# Patient Record
Sex: MALE | Race: BLACK OR AFRICAN AMERICAN | NOT HISPANIC OR LATINO | URBAN - METROPOLITAN AREA
[De-identification: names, ages, dates, MRNs, and addresses within clinical notes are randomized per-mention and may not be internally consistent; named-entity substitution may affect disease eponyms.]

---

## 2018-03-07 RX ADMIN — Medication 975 MILLIGRAM(S): at 23:37

## 2018-03-08 ENCOUNTER — INPATIENT (INPATIENT)
Facility: HOSPITAL | Age: 38
LOS: 5 days | Discharge: HOME | End: 2018-03-14
Attending: INTERNAL MEDICINE | Admitting: INTERNAL MEDICINE
Payer: MEDICAID

## 2018-03-08 VITALS
HEART RATE: 116 BPM | TEMPERATURE: 102 F | WEIGHT: 160.06 LBS | RESPIRATION RATE: 18 BRPM | SYSTOLIC BLOOD PRESSURE: 114 MMHG | OXYGEN SATURATION: 99 % | DIASTOLIC BLOOD PRESSURE: 64 MMHG

## 2018-03-08 LAB
BASOPHILS # BLD AUTO: 0.03 K/UL — SIGNIFICANT CHANGE UP (ref 0–0.2)
BASOPHILS NFR BLD AUTO: 0.3 % — SIGNIFICANT CHANGE UP (ref 0–2)
EOSINOPHIL # BLD AUTO: 0.15 K/UL — SIGNIFICANT CHANGE UP (ref 0–0.5)
EOSINOPHIL NFR BLD AUTO: 1.7 % — SIGNIFICANT CHANGE UP (ref 0–6)
HCT VFR BLD CALC: 48.3 % — SIGNIFICANT CHANGE UP (ref 39–50)
HGB BLD-MCNC: 15.3 G/DL — SIGNIFICANT CHANGE UP (ref 13–17)
IMM GRANULOCYTES # BLD AUTO: 0.03 # — SIGNIFICANT CHANGE UP
IMM GRANULOCYTES NFR BLD AUTO: 0.3 % — SIGNIFICANT CHANGE UP (ref 0–1.5)
LYMPHOCYTES # BLD AUTO: 0.64 K/UL — LOW (ref 1–3.3)
LYMPHOCYTES # BLD AUTO: 7.2 % — LOW (ref 13–44)
MCHC RBC-ENTMCNC: 29.4 PG — SIGNIFICANT CHANGE UP (ref 27–34)
MCHC RBC-ENTMCNC: 31.7 % — LOW (ref 32–36)
MCV RBC AUTO: 92.7 FL — SIGNIFICANT CHANGE UP (ref 80–100)
MONOCYTES # BLD AUTO: 1.07 K/UL — HIGH (ref 0–0.9)
MONOCYTES NFR BLD AUTO: 12.1 % — SIGNIFICANT CHANGE UP (ref 2–14)
NEUTROPHILS # BLD AUTO: 6.92 K/UL — SIGNIFICANT CHANGE UP (ref 1.8–7.4)
NEUTROPHILS NFR BLD AUTO: 78.4 % — HIGH (ref 43–77)
NRBC # FLD: 0 — SIGNIFICANT CHANGE UP
PLATELET # BLD AUTO: 202 K/UL — SIGNIFICANT CHANGE UP (ref 150–400)
PMV BLD: 9.8 FL — SIGNIFICANT CHANGE UP (ref 7–13)
RBC # BLD: 5.21 M/UL — SIGNIFICANT CHANGE UP (ref 4.2–5.8)
RBC # FLD: 12.9 % — SIGNIFICANT CHANGE UP (ref 10.3–14.5)
WBC # BLD: 8.84 K/UL — SIGNIFICANT CHANGE UP (ref 3.8–10.5)
WBC # FLD AUTO: 8.84 K/UL — SIGNIFICANT CHANGE UP (ref 3.8–10.5)

## 2018-03-08 PROCEDURE — 71046 X-RAY EXAM CHEST 2 VIEWS: CPT | Mod: 26

## 2018-03-08 RX ORDER — SODIUM CHLORIDE 9 MG/ML
2000 INJECTION INTRAMUSCULAR; INTRAVENOUS; SUBCUTANEOUS ONCE
Qty: 0 | Refills: 0 | Status: COMPLETED | OUTPATIENT
Start: 2018-03-08 | End: 2018-03-08

## 2018-03-08 RX ORDER — METOCLOPRAMIDE HCL 10 MG
10 TABLET ORAL ONCE
Qty: 0 | Refills: 0 | Status: COMPLETED | OUTPATIENT
Start: 2018-03-08 | End: 2018-03-08

## 2018-03-08 RX ORDER — ACETAMINOPHEN 500 MG
975 TABLET ORAL ONCE
Qty: 0 | Refills: 0 | Status: COMPLETED | OUTPATIENT
Start: 2018-03-08 | End: 2018-03-08

## 2018-03-08 RX ORDER — KETOROLAC TROMETHAMINE 30 MG/ML
15 SYRINGE (ML) INJECTION ONCE
Qty: 0 | Refills: 0 | Status: DISCONTINUED | OUTPATIENT
Start: 2018-03-08 | End: 2018-03-08

## 2018-03-08 RX ADMIN — Medication 15 MILLIGRAM(S): at 23:07

## 2018-03-08 RX ADMIN — Medication 10 MILLIGRAM(S): at 23:07

## 2018-03-08 RX ADMIN — Medication 15 MILLIGRAM(S): at 23:37

## 2018-03-08 RX ADMIN — SODIUM CHLORIDE 1000 MILLILITER(S): 9 INJECTION INTRAMUSCULAR; INTRAVENOUS; SUBCUTANEOUS at 23:07

## 2018-03-08 RX ADMIN — Medication 975 MILLIGRAM(S): at 23:07

## 2018-03-08 NOTE — ED PROVIDER NOTE - MEDICAL DECISION MAKING DETAILS
38 yo male with flu like illness, syncope, tachycardia and short HI on ekg- multiple previous syncopes  in the past. will obtain cbc, cmp cardiac enzymes to r/o myocarditis, cxr, and admit.

## 2018-03-08 NOTE — ED PROVIDER NOTE - ATTENDING CONTRIBUTION TO CARE
Cas: 36 yo male with no significant medical history c/o URI- flu like illness since yesterday. +nonproductive cough, nasal congestion and fevers. Pt also c/o mylagias. No chest pain or SOB. No abdominal pain, diaphoresis, nausea or vomiting. Pt had an episode of syncope today while sitting in a chair. endorses multiple syncopal episodes in the past- associated with illness or stress. Exam: ill appearing, NAD, + nasal congestion, MMM, +regular tachycardia with subtle murmur at left sternal border. lungs CTA b/l. No LE edema or calf TTP. abdomen is soft and nontender. A/P- 36 yo male with flu like illness, syncope, tachycardia and short ME on ekg- multiple previous syncopes  in the past. will obtain cbc, cmp cardiac enzymes to r/o myocarditis, cxr, and admit to tele.

## 2018-03-08 NOTE — ED PROVIDER NOTE - OBJECTIVE STATEMENT
36 y/o M with no pertinent medical history presents with complaint of headache, fever and body aches since last night. Symptoms worsened today. Also having cough. Denies any chest pain or SOB. Patient reports that he passed out earlier today while sitting in [employer], did not fall. Reports that he has had previous episodes of syncope in the past in relation to pain. Patient's wife had similar symptoms a few days ago and works in [employer] as PCA.

## 2018-03-08 NOTE — ED ADULT TRIAGE NOTE - CHIEF COMPLAINT QUOTE
Ambulatory complaining of HA, lower back pain and BL shoulder pain since this morning. Febrile in triage, denies travel or sick contacks. States that he was at the [employer] shop this morning and passed out while in the chair for two minutes or so. States he has a Hx of passing out. Denies hitting his head, pain on urination. Unable to sit in triage. Pain 10/10

## 2018-03-08 NOTE — ED PROVIDER NOTE - SHIFT CHANGE DETAILS
I have signed over this patient to the above attending physician. Pertinent history, physical exam findings and workup thus far in the ED have been discussed. The pending tests and plan, including labs, cxr, rvp, and admission were signed over.  All questions from the above attending physician have been answered.

## 2018-03-09 DIAGNOSIS — R94.31 ABNORMAL ELECTROCARDIOGRAM [ECG] [EKG]: ICD-10-CM

## 2018-03-09 DIAGNOSIS — J11.1 INFLUENZA DUE TO UNIDENTIFIED INFLUENZA VIRUS WITH OTHER RESPIRATORY MANIFESTATIONS: ICD-10-CM

## 2018-03-09 DIAGNOSIS — R55 SYNCOPE AND COLLAPSE: ICD-10-CM

## 2018-03-09 DIAGNOSIS — Z29.9 ENCOUNTER FOR PROPHYLACTIC MEASURES, UNSPECIFIED: ICD-10-CM

## 2018-03-09 LAB
ALBUMIN SERPL ELPH-MCNC: 4.4 G/DL — SIGNIFICANT CHANGE UP (ref 3.3–5)
ALP SERPL-CCNC: 54 U/L — SIGNIFICANT CHANGE UP (ref 40–120)
ALT FLD-CCNC: 28 U/L — SIGNIFICANT CHANGE UP (ref 4–41)
AMPHET UR-MCNC: NEGATIVE — SIGNIFICANT CHANGE UP
APAP SERPL-MCNC: < 15 UG/ML — LOW (ref 15–25)
AST SERPL-CCNC: 27 U/L — SIGNIFICANT CHANGE UP (ref 4–40)
B PERT DNA SPEC QL NAA+PROBE: SIGNIFICANT CHANGE UP
BARBITURATES MEASUREMENT: NEGATIVE — SIGNIFICANT CHANGE UP
BARBITURATES UR SCN-MCNC: NEGATIVE — SIGNIFICANT CHANGE UP
BENZODIAZ SERPL-MCNC: NEGATIVE — SIGNIFICANT CHANGE UP
BENZODIAZ UR-MCNC: NEGATIVE — SIGNIFICANT CHANGE UP
BILIRUB SERPL-MCNC: 0.7 MG/DL — SIGNIFICANT CHANGE UP (ref 0.2–1.2)
BUN SERPL-MCNC: 11 MG/DL — SIGNIFICANT CHANGE UP (ref 7–23)
BUN SERPL-MCNC: 12 MG/DL — SIGNIFICANT CHANGE UP (ref 7–23)
C PNEUM DNA SPEC QL NAA+PROBE: NOT DETECTED — SIGNIFICANT CHANGE UP
CALCIUM SERPL-MCNC: 8.3 MG/DL — LOW (ref 8.4–10.5)
CALCIUM SERPL-MCNC: 9.1 MG/DL — SIGNIFICANT CHANGE UP (ref 8.4–10.5)
CANNABINOIDS UR-MCNC: NEGATIVE — SIGNIFICANT CHANGE UP
CHLORIDE SERPL-SCNC: 101 MMOL/L — SIGNIFICANT CHANGE UP (ref 98–107)
CHLORIDE SERPL-SCNC: 107 MMOL/L — SIGNIFICANT CHANGE UP (ref 98–107)
CHOLEST SERPL-MCNC: 101 MG/DL — LOW (ref 120–199)
CK MB BLD-MCNC: 1 NG/ML — SIGNIFICANT CHANGE UP (ref 1–6.6)
CK MB BLD-MCNC: 1 NG/ML — SIGNIFICANT CHANGE UP (ref 1–6.6)
CK SERPL-CCNC: 107 U/L — SIGNIFICANT CHANGE UP (ref 30–200)
CK SERPL-CCNC: 130 U/L — SIGNIFICANT CHANGE UP (ref 30–200)
CO2 SERPL-SCNC: 24 MMOL/L — SIGNIFICANT CHANGE UP (ref 22–31)
CO2 SERPL-SCNC: 26 MMOL/L — SIGNIFICANT CHANGE UP (ref 22–31)
COCAINE METAB.OTHER UR-MCNC: NEGATIVE — SIGNIFICANT CHANGE UP
CREAT SERPL-MCNC: 1.01 MG/DL — SIGNIFICANT CHANGE UP (ref 0.5–1.3)
CREAT SERPL-MCNC: 1.17 MG/DL — SIGNIFICANT CHANGE UP (ref 0.5–1.3)
ETHANOL BLD-MCNC: < 10 MG/DL — SIGNIFICANT CHANGE UP
FLUAV H1 2009 PAND RNA SPEC QL NAA+PROBE: NOT DETECTED — SIGNIFICANT CHANGE UP
FLUAV H1 RNA SPEC QL NAA+PROBE: NOT DETECTED — SIGNIFICANT CHANGE UP
FLUAV H3 RNA SPEC QL NAA+PROBE: POSITIVE — HIGH
FLUBV RNA SPEC QL NAA+PROBE: NOT DETECTED — SIGNIFICANT CHANGE UP
GLUCOSE SERPL-MCNC: 83 MG/DL — SIGNIFICANT CHANGE UP (ref 70–99)
GLUCOSE SERPL-MCNC: 95 MG/DL — SIGNIFICANT CHANGE UP (ref 70–99)
HADV DNA SPEC QL NAA+PROBE: NOT DETECTED — SIGNIFICANT CHANGE UP
HBA1C BLD-MCNC: 5.6 % — SIGNIFICANT CHANGE UP (ref 4–5.6)
HCOV 229E RNA SPEC QL NAA+PROBE: NOT DETECTED — SIGNIFICANT CHANGE UP
HCOV HKU1 RNA SPEC QL NAA+PROBE: NOT DETECTED — SIGNIFICANT CHANGE UP
HCOV NL63 RNA SPEC QL NAA+PROBE: NOT DETECTED — SIGNIFICANT CHANGE UP
HCOV OC43 RNA SPEC QL NAA+PROBE: NOT DETECTED — SIGNIFICANT CHANGE UP
HCT VFR BLD CALC: 44.3 % — SIGNIFICANT CHANGE UP (ref 39–50)
HDLC SERPL-MCNC: 53 MG/DL — SIGNIFICANT CHANGE UP (ref 35–55)
HGB BLD-MCNC: 13.9 G/DL — SIGNIFICANT CHANGE UP (ref 13–17)
HMPV RNA SPEC QL NAA+PROBE: NOT DETECTED — SIGNIFICANT CHANGE UP
HPIV1 RNA SPEC QL NAA+PROBE: NOT DETECTED — SIGNIFICANT CHANGE UP
HPIV2 RNA SPEC QL NAA+PROBE: NOT DETECTED — SIGNIFICANT CHANGE UP
HPIV3 RNA SPEC QL NAA+PROBE: NOT DETECTED — SIGNIFICANT CHANGE UP
HPIV4 RNA SPEC QL NAA+PROBE: NOT DETECTED — SIGNIFICANT CHANGE UP
LIPID PNL WITH DIRECT LDL SERPL: 58 MG/DL — SIGNIFICANT CHANGE UP
M PNEUMO DNA SPEC QL NAA+PROBE: NOT DETECTED — SIGNIFICANT CHANGE UP
MAGNESIUM SERPL-MCNC: 1.9 MG/DL — SIGNIFICANT CHANGE UP (ref 1.6–2.6)
MAGNESIUM SERPL-MCNC: 2.1 MG/DL — SIGNIFICANT CHANGE UP (ref 1.6–2.6)
MCHC RBC-ENTMCNC: 29.6 PG — SIGNIFICANT CHANGE UP (ref 27–34)
MCHC RBC-ENTMCNC: 31.4 % — LOW (ref 32–36)
MCV RBC AUTO: 94.5 FL — SIGNIFICANT CHANGE UP (ref 80–100)
METHADONE UR-MCNC: NEGATIVE — SIGNIFICANT CHANGE UP
NRBC # FLD: 0 — SIGNIFICANT CHANGE UP
OPIATES UR-MCNC: NEGATIVE — SIGNIFICANT CHANGE UP
OXYCODONE UR-MCNC: NEGATIVE — SIGNIFICANT CHANGE UP
PCP UR-MCNC: NEGATIVE — SIGNIFICANT CHANGE UP
PHOSPHATE SERPL-MCNC: 2.4 MG/DL — LOW (ref 2.5–4.5)
PLATELET # BLD AUTO: 168 K/UL — SIGNIFICANT CHANGE UP (ref 150–400)
PMV BLD: 10.2 FL — SIGNIFICANT CHANGE UP (ref 7–13)
POTASSIUM SERPL-MCNC: 4.4 MMOL/L — SIGNIFICANT CHANGE UP (ref 3.5–5.3)
POTASSIUM SERPL-MCNC: 4.5 MMOL/L — SIGNIFICANT CHANGE UP (ref 3.5–5.3)
POTASSIUM SERPL-SCNC: 4.4 MMOL/L — SIGNIFICANT CHANGE UP (ref 3.5–5.3)
POTASSIUM SERPL-SCNC: 4.5 MMOL/L — SIGNIFICANT CHANGE UP (ref 3.5–5.3)
PROT SERPL-MCNC: 7.5 G/DL — SIGNIFICANT CHANGE UP (ref 6–8.3)
RBC # BLD: 4.69 M/UL — SIGNIFICANT CHANGE UP (ref 4.2–5.8)
RBC # FLD: 13 % — SIGNIFICANT CHANGE UP (ref 10.3–14.5)
RSV RNA SPEC QL NAA+PROBE: NOT DETECTED — SIGNIFICANT CHANGE UP
RV+EV RNA SPEC QL NAA+PROBE: NOT DETECTED — SIGNIFICANT CHANGE UP
SALICYLATES SERPL-MCNC: < 5 MG/DL — LOW (ref 15–30)
SODIUM SERPL-SCNC: 139 MMOL/L — SIGNIFICANT CHANGE UP (ref 135–145)
SODIUM SERPL-SCNC: 141 MMOL/L — SIGNIFICANT CHANGE UP (ref 135–145)
TRIGL SERPL-MCNC: 30 MG/DL — SIGNIFICANT CHANGE UP (ref 10–149)
TROPONIN T SERPL-MCNC: < 0.06 NG/ML — SIGNIFICANT CHANGE UP (ref 0–0.06)
TROPONIN T SERPL-MCNC: < 0.06 NG/ML — SIGNIFICANT CHANGE UP (ref 0–0.06)
TSH SERPL-MCNC: 0.28 UIU/ML — SIGNIFICANT CHANGE UP (ref 0.27–4.2)
WBC # BLD: 7.85 K/UL — SIGNIFICANT CHANGE UP (ref 3.8–10.5)
WBC # FLD AUTO: 7.85 K/UL — SIGNIFICANT CHANGE UP (ref 3.8–10.5)

## 2018-03-09 PROCEDURE — 93306 TTE W/DOPPLER COMPLETE: CPT | Mod: 26

## 2018-03-09 PROCEDURE — 99222 1ST HOSP IP/OBS MODERATE 55: CPT | Mod: GC

## 2018-03-09 RX ORDER — SODIUM CHLORIDE 9 MG/ML
1000 INJECTION INTRAMUSCULAR; INTRAVENOUS; SUBCUTANEOUS
Qty: 0 | Refills: 0 | Status: DISCONTINUED | OUTPATIENT
Start: 2018-03-09 | End: 2018-03-11

## 2018-03-09 RX ORDER — INFLUENZA VIRUS VACCINE 15; 15; 15; 15 UG/.5ML; UG/.5ML; UG/.5ML; UG/.5ML
0.5 SUSPENSION INTRAMUSCULAR ONCE
Qty: 0 | Refills: 0 | Status: DISCONTINUED | OUTPATIENT
Start: 2018-03-09 | End: 2018-03-14

## 2018-03-09 RX ORDER — ACETAMINOPHEN 500 MG
650 TABLET ORAL EVERY 6 HOURS
Qty: 0 | Refills: 0 | Status: DISCONTINUED | OUTPATIENT
Start: 2018-03-09 | End: 2018-03-14

## 2018-03-09 RX ADMIN — Medication 650 MILLIGRAM(S): at 16:30

## 2018-03-09 RX ADMIN — Medication 650 MILLIGRAM(S): at 08:57

## 2018-03-09 RX ADMIN — SODIUM CHLORIDE 150 MILLILITER(S): 9 INJECTION INTRAMUSCULAR; INTRAVENOUS; SUBCUTANEOUS at 02:43

## 2018-03-09 RX ADMIN — Medication 75 MILLIGRAM(S): at 06:02

## 2018-03-09 RX ADMIN — Medication 75 MILLIGRAM(S): at 17:21

## 2018-03-09 NOTE — H&P ADULT - PROBLEM SELECTOR PLAN 2
RVP positive for Influenza   Will start patient on Tamiflu 75mg BID for 5 days   Placed on droplet precautions RVP positive for Influenza    Tamiflu 75mg BID for 5 days   Placed on droplet precautions

## 2018-03-09 NOTE — H&P ADULT - NEGATIVE MUSCULOSKELETAL SYMPTOMS
no muscle weakness/no arthralgia/no myalgia/no joint swelling/no muscle cramps/no arthritis/no stiffness/no neck pain

## 2018-03-09 NOTE — H&P ADULT - NEGATIVE GASTROINTESTINAL SYMPTOMS
no diarrhea/no melena/no change in bowel habits/no hematochezia/no nausea/no vomiting/no constipation/no abdominal pain

## 2018-03-09 NOTE — H&P ADULT - ASSESSMENT
38 y/o M with no PMH presented with flu like symptoms and syncope    +Syncope  +Flu positive-On Tamiflu

## 2018-03-09 NOTE — H&P ADULT - NEGATIVE CARDIOVASCULAR SYMPTOMS
no orthopnea/no paroxysmal nocturnal dyspnea/no chest pain/no dyspnea on exertion/no peripheral edema/no palpitations

## 2018-03-09 NOTE — H&P ADULT - RS GEN PE MLT RESP DETAILS PC
breath sounds equal/good air movement/no chest wall tenderness/no intercostal retractions/normal/no rhonchi/no rales/no wheezes/respirations non-labored/airway patent/clear to auscultation bilaterally

## 2018-03-09 NOTE — H&P ADULT - FAMILY HISTORY
Grandparent  Still living? Unknown  Family history of cerebrovascular accident (CVA), Age at diagnosis: Age Unknown  Family history of prostate cancer, Age at diagnosis: Age Unknown

## 2018-03-09 NOTE — H&P ADULT - PROBLEM SELECTOR PLAN 1
Will monitor on telemetry, serial CE's, serial EKG  HgbA1C, TSH, lipid profile, CBC, CMP in am   TTE ordered to evaluate LVEF   Orthostatics ordered  Fall precautions ordered   Started on IVF at 150cc/hr due to low blood pressure Tele  TTE   Fall precautions  Cardio eval noted.

## 2018-03-09 NOTE — H&P ADULT - NEGATIVE NEUROLOGICAL SYMPTOMS
no weakness/no generalized seizures/no focal seizures/no hemiparesis/no tremors/no vertigo/no loss of sensation/no paresthesias/no difficulty walking/no confusion

## 2018-03-09 NOTE — H&P ADULT - NSHPLABSRESULTS_GEN_ALL_CORE
15.3   8.84  )-----------( 202      ( 08 Mar 2018 23:05 )             48.3     03-08    139  |  101  |  12  ----------------------------<  95  4.5   |  24  |  1.17    Ca    9.1      08 Mar 2018 23:05  Phos  2.4     03-08  Mg     1.9     03-08    TPro  7.5  /  Alb  4.4  /  TBili  0.7  /  DBili  x   /  AST  27  /  ALT  28  /  AlkPhos  54  03-08    CARDIAC MARKERS ( 08 Mar 2018 23:05 )  x     / < 0.06 ng/mL / 130 u/L / 1.00 ng/mL / x        EKG: Sinus rhythm with sinus arrhythmia with short IN interval at a rate of 97 with QTc of 383

## 2018-03-09 NOTE — CONSULT NOTE ADULT - ASSESSMENT
37 year old man admitted with syncope and influenza    1. Acute influenza  tamiflu as ordered  med eval     2. Syncope   likely secondary to volume status in setting of febrile/flu illness  ecg with tachycardia expected from viral infection but with short pr interval and some suggestion of preexcitation on some leads  in light of previous syncopal episodes will call eps to evaluate    check echo   check orthostatics  tele    dvt ppx

## 2018-03-09 NOTE — H&P ADULT - NEGATIVE OPHTHALMOLOGIC SYMPTOMS
no blurred vision R/no lacrimation L/no lacrimation R/no discharge L/no diplopia/no photophobia/no blurred vision L/no discharge R

## 2018-03-09 NOTE — H&P ADULT - PROBLEM SELECTOR PLAN 3
EKG revealed short MN interval(mild delta waves in V3-V6)  Will monitor on telemetry for now   Consider EP consult in am if warranted   TTE ordered EKG revealed short CO interval(mild delta waves in V4-V6)  Will monitor on telemetry for now   Consider EP consult in am if warranted   TTE ordered Tele  TTE

## 2018-03-09 NOTE — H&P ADULT - HISTORY OF PRESENT ILLNESS
36 y/o M with no PMH presented with flu like symptoms and syncope. As per the patient he had flu like symptoms last week which he got over and yesterday went to sleep with a headache and woke up with severe frontal headache. Patient then also endorsed body aches, subjective fever, non productive cough and nasal discharge that started yesterday. [place]. Patient stated that yesterday he was sitting on the chair about to get his hair cut when he developed lightheadedness and dizziness and passed out. Patient stated that he passed out for about few seconds and then when he came about he knew where he was. Patient stated that he had an episode of syncope in the past. Patient denied any head trauma with this syncopal event, any bowel or bladder incontinence. Patient denied any chills, CP, palpitations, N/V/D/C, abdominal pain, dysuria, melena, hematochezia, sick contact, pleuritic or positional chest pain.    On ED admission EKG revealed Sinus rhythm with sinus arrhythmia with short SD interval at a rate of 97 with QTc of 383, CE x 1: Negative, RVP: Positive flu, Phos: 2.4. Patient received 2L of fluid in the ED.

## 2018-03-09 NOTE — H&P ADULT - GASTROINTESTINAL DETAILS
nontender/no rebound tenderness/no rigidity/no guarding/normal/soft/no distention/bowel sounds normal

## 2018-03-09 NOTE — CONSULT NOTE ADULT - SUBJECTIVE AND OBJECTIVE BOX
CARDIOLOGY CONSULT NOTE - DR. BUSH    CHIEF COMPLAINT/REASON FOR CONSULT:    HPI:  Patient is a 37 year old man with no medical history admitted with flu like symptoms and syncope.   C/O headache, fever, body aches  + syncope yesterday while in the chair  also describes syncopal episodes last year while standing and working on an automobile       Patient denies any chest pain, dyspnea, palpitations, edema, exertional symptoms, nausea, abdominal pain,,  or rash.  Denies any history of CAD, MI, valve disease, cardiomyopathy, or congenital heart disease.      PAST MEDICAL & SURGICAL HISTORY:  No pertinent past medical history  No significant past surgical history        PREVIOUS DIAGNOSTIC TESTING:    [ ] Echocardiogram:  [ ]  Catheterization:  [ ] Stress Test:  	    MEDICATIONS:  MEDICATIONS  (STANDING):  oseltamivir 75 milliGRAM(s) Oral two times a day  sodium chloride 0.9%. 1000 milliLiter(s) (150 mL/Hr) IV Continuous <Continuous>      FAMILY HISTORY:  Family history of prostate cancer (Grandparent)  Family history of cerebrovascular accident (CVA) (Grandparent)      SOCIAL HISTORY:    [x ] Non-smoker  [ ] Smoker  [ ] Alcohol    Allergies    No Known Allergies    Intolerances    	    REVIEW OF SYSTEMS:  CONSTITUTIONAL: No weight loss,   EYES: No eye pain, visual disturbances, or discharge  ENMT:  No difficulty hearing, tinnitus, vertigo; No sinus or throat pain  NECK: No pain or stiffness  RESPIRATORY: No wheezing, or hemoptysis;  CARDIOVASCULAR: No chest pain,or leg swelling  GASTROINTESTINAL: No abdominal or epigastric pain. No nausea, vomiting, or hematemesis; No diarrhea or constipation. No melena or hematochezia.  GENITOURINARY: No dysuria, frequency, hematuria, or incontinence  NEUROLOGICAL: No headaches, memory loss, loss of strength, numbness, or tremors  SKIN: No itching, burning, rashes, or lesions   	    [ ] All others negative	  [ ] Unable to obtain    PHYSICAL EXAM:  T(C): 36.8 (03-09-18 @ 06:38), Max: 38.7 (03-08-18 @ 20:14)  HR: 102 (03-09-18 @ 06:38) (94 - 116)  BP: 118/69 (03-09-18 @ 06:38) (106/45 - 118/69)  RR: 18 (03-09-18 @ 06:38) (18 - 18)  SpO2: 100% (03-09-18 @ 06:38) (97% - 100%)  Wt(kg): --  I&O's Summary      Appearance: Normal	  Psychiatry: A & O x 3, Mood & affect appropriate  HEENT:   Normal oral mucosa, PERRL, EOMI	  Lymphatic: No lymphadenopathy  Cardiovascular: Normal S1 S2,RRR, No JVD, No murmurs  Respiratory: Lungs clear to auscultation	  Gastrointestinal:  Soft, Non-tender, + BS	  Skin: No rashes, No ecchymoses, No cyanosis	  Neurologic: Non-focal  Extremities: Normal range of motion, No clubbing, cyanosis or edema  Vascular: Peripheral pulses palpable 2+ bilaterally    TELEMETRY: 	    ECG:  	sr, short pr   RADIOLOGY:  OTHER: 	  	  LABS:	 	    CARDIAC MARKERS:      CKMB: 1.00 ng/mL (03-09 @ 05:45)  CKMB: 1.00 ng/mL (03-08 @ 23:05)                              13.9   7.85  )-----------( 168      ( 09 Mar 2018 05:45 )             44.3     03-09    141  |  107  |  11  ----------------------------<  83  4.4   |  26  |  1.01    Ca    8.3<L>      09 Mar 2018 05:45  Phos  2.4     03-08  Mg     2.1     03-09    TPro  7.5  /  Alb  4.4  /  TBili  0.7  /  DBili  x   /  AST  27  /  ALT  28  /  AlkPhos  54  03-08      proBNP:   Lipid Profile:   HgA1c: Hemoglobin A1C, Whole Blood: 5.6 % (03-09 @ 05:45)    TSH: Thyroid Stimulating Hormone, Serum: 0.28 uIU/mL (03-08 @ 23:05)      ASSESSMENT/PLAN:
Patient seen and evaluated at bedside    Chief Complaint: syncope    HPI:  36 y/o M with no PMH presented with flu like symptoms and syncope. As per the patient he had flu like symptoms last week which he got over and yesterday went to sleep with a headache and woke up with severe frontal headache. Patient then also endorsed body aches, subjective fever, non productive cough and nasal discharge that started yesterday. Patient stated [place] so [place] people. Patient stated that yesterday he was sitting on the chair about to get his hair cut when he developed lightheadedness and dizziness and passed out. Patient stated that he passed out for about few seconds and then when he came about he knew where he was. Patient stated that he had an episode of syncope in the past. Patient denied any head trauma with this syncopal event, any bowel or bladder incontinence. Patient denied any chills, CP, palpitations, N/V/D/C, abdominal pain, dysuria, melena, hematochezia, sick contact, pleuritic or positional chest pain.      PMH:   No pertinent past medical history      PSH:   No significant past surgical history      Medications:   acetaminophen   Tablet 650 milliGRAM(s) Oral every 6 hours PRN  oseltamivir 75 milliGRAM(s) Oral two times a day  sodium chloride 0.9%. 1000 milliLiter(s) IV Continuous <Continuous>      Allergies:  No Known Allergies      FAMILY HISTORY:  Family history of prostate cancer (Grandparent)  Family history of cerebrovascular accident (CVA) (Grandparent)      Social History:  Smoking History: denies  Alcohol Use: denies  Drug Use: denies    Review of Systems:  REVIEW OF SYSTEMS:    CONSTITUTIONAL: +fevers and chills  EYES/ENT: No visual changes;  No dysphagia  RESPIRATORY: No cough, wheezing, hemoptysis; No shortness of breath  CARDIOVASCULAR: No chest pain or palpitations; No lower extremity edema  GASTROINTESTINAL: No abdominal or epigastric pain. No nausea, vomiting, or hematemesis; No diarrhea or constipation. No melena or hematochezia.  BACK: +back pain  GENITOURINARY: No dysuria, frequency or hematuria  NEUROLOGICAL: No numbness or weakness  SKIN: No itching, burning, rashes, or lesions     Physical Exam:  T(F): 99.1 (-), Max: 101.7 (-)  HR: 101 (03-09) (94 - 116)  BP: 93/55 () (93/55 - 118/69)  RR: 20 ()  SpO2: 100% ()  GENERAL: No acute distress, well-developed  HEAD:  Atraumatic, Normocephalic  ENT: EOMI, No JVD, moist mucosa  CHEST/LUNG: Clear to auscultation bilaterally; No wheeze, equal breath sounds bilaterally   HEART: Regular rate and rhythm; No murmurs, rubs, or gallops  ABDOMEN: Soft, Nontender, Nondistended; Bowel sounds present  EXTREMITIES:  No clubbing, cyanosis, or edema  PSYCH: Nl behavior, nl affect  NEUROLOGY: AAOx3, non-focal    Cardiovascular Diagnostic Testing:    ECG: Personally reviewed  SR, HR 97, short MD    Labs: Personally reviewed                        13.9   7.85  )-----------( 168      ( 09 Mar 2018 05:45 )             44.3         141  |  107  |  11  ----------------------------<  83  4.4   |  26  |  1.01    Ca    8.3<L>      09 Mar 2018 05:45  Phos  2.4     -  Mg     2.1     -    TPro  7.5  /  Alb  4.4  /  TBili  0.7  /  DBili  x   /  AST  27  /  ALT  28  /  AlkPhos  54  03-      CARDIAC MARKERS ( 09 Mar 2018 05:45 )  x     / < 0.06 ng/mL / 107 u/L / 1.00 ng/mL / x      CARDIAC MARKERS ( 08 Mar 2018 23:05 )  x     / < 0.06 ng/mL / 130 u/L / 1.00 ng/mL / x            Total Cholesterol: 101  LDL: 58  HDL: 53  T    Hemoglobin A1C, Whole Blood: 5.6 % ( @ 05:45)    Thyroid Stimulating Hormone, Serum: 0.28 uIU/mL ( @ 23:05)      A/P:  36 y/o M with no PMH presented with flu like symptoms and syncope.    Syncope. In the setting of flu. Orthostatic positive. Could be 2/2 dehydration. However, has had 2 other cases with syncope in the past, one when he was not ill concerning for arrhythmia.  - possible ILR once patient not infectious  - cont to monitor on telemetry      Warner Jacob MD  Cardiology Fellow [id number]

## 2018-03-10 LAB
APPEARANCE UR: CLEAR — SIGNIFICANT CHANGE UP
BILIRUB UR-MCNC: NEGATIVE — SIGNIFICANT CHANGE UP
BLOOD UR QL VISUAL: NEGATIVE — SIGNIFICANT CHANGE UP
BUN SERPL-MCNC: 10 MG/DL — SIGNIFICANT CHANGE UP (ref 7–23)
CALCIUM SERPL-MCNC: 7.8 MG/DL — LOW (ref 8.4–10.5)
CHLORIDE SERPL-SCNC: 107 MMOL/L — SIGNIFICANT CHANGE UP (ref 98–107)
CO2 SERPL-SCNC: 21 MMOL/L — LOW (ref 22–31)
COLOR SPEC: SIGNIFICANT CHANGE UP
CREAT SERPL-MCNC: 1.03 MG/DL — SIGNIFICANT CHANGE UP (ref 0.5–1.3)
GLUCOSE SERPL-MCNC: 91 MG/DL — SIGNIFICANT CHANGE UP (ref 70–99)
GLUCOSE UR-MCNC: NEGATIVE — SIGNIFICANT CHANGE UP
HCT VFR BLD CALC: 40.3 % — SIGNIFICANT CHANGE UP (ref 39–50)
HGB BLD-MCNC: 13.3 G/DL — SIGNIFICANT CHANGE UP (ref 13–17)
KETONES UR-MCNC: SIGNIFICANT CHANGE UP
LEUKOCYTE ESTERASE UR-ACNC: NEGATIVE — SIGNIFICANT CHANGE UP
MAGNESIUM SERPL-MCNC: 1.6 MG/DL — SIGNIFICANT CHANGE UP (ref 1.6–2.6)
MCHC RBC-ENTMCNC: 30.7 PG — SIGNIFICANT CHANGE UP (ref 27–34)
MCHC RBC-ENTMCNC: 33 % — SIGNIFICANT CHANGE UP (ref 32–36)
MCV RBC AUTO: 93.1 FL — SIGNIFICANT CHANGE UP (ref 80–100)
MUCOUS THREADS # UR AUTO: SIGNIFICANT CHANGE UP
NITRITE UR-MCNC: NEGATIVE — SIGNIFICANT CHANGE UP
NRBC # FLD: 0 — SIGNIFICANT CHANGE UP
PH UR: 5.5 — SIGNIFICANT CHANGE UP (ref 4.6–8)
PHOSPHATE SERPL-MCNC: 2.6 MG/DL — SIGNIFICANT CHANGE UP (ref 2.5–4.5)
PLATELET # BLD AUTO: 144 K/UL — LOW (ref 150–400)
PMV BLD: 10.5 FL — SIGNIFICANT CHANGE UP (ref 7–13)
POTASSIUM SERPL-MCNC: 3.8 MMOL/L — SIGNIFICANT CHANGE UP (ref 3.5–5.3)
POTASSIUM SERPL-SCNC: 3.8 MMOL/L — SIGNIFICANT CHANGE UP (ref 3.5–5.3)
PROT UR-MCNC: NEGATIVE MG/DL — SIGNIFICANT CHANGE UP
RBC # BLD: 4.33 M/UL — SIGNIFICANT CHANGE UP (ref 4.2–5.8)
RBC # FLD: 13 % — SIGNIFICANT CHANGE UP (ref 10.3–14.5)
RBC CASTS # UR COMP ASSIST: SIGNIFICANT CHANGE UP (ref 0–?)
SODIUM SERPL-SCNC: 140 MMOL/L — SIGNIFICANT CHANGE UP (ref 135–145)
SP GR SPEC: 1.01 — SIGNIFICANT CHANGE UP (ref 1–1.04)
UROBILINOGEN FLD QL: NORMAL MG/DL — SIGNIFICANT CHANGE UP
WBC # BLD: 5.57 K/UL — SIGNIFICANT CHANGE UP (ref 3.8–10.5)
WBC # FLD AUTO: 5.57 K/UL — SIGNIFICANT CHANGE UP (ref 3.8–10.5)
WBC UR QL: SIGNIFICANT CHANGE UP (ref 0–?)

## 2018-03-10 RX ADMIN — Medication 75 MILLIGRAM(S): at 17:10

## 2018-03-10 RX ADMIN — SODIUM CHLORIDE 150 MILLILITER(S): 9 INJECTION INTRAMUSCULAR; INTRAVENOUS; SUBCUTANEOUS at 22:04

## 2018-03-10 RX ADMIN — Medication 75 MILLIGRAM(S): at 05:38

## 2018-03-10 NOTE — PROGRESS NOTE ADULT - SUBJECTIVE AND OBJECTIVE BOX
CARDIOLOGY FOLLOW UP NOTE - DR. BUSH    Subjective:      PHYSICAL EXAM:  T(C): 37.2 (03-10-18 @ 09:30), Max: 38.4 (03-09-18 @ 16:21)  HR: 76 (03-10-18 @ 09:30) (72 - 88)  BP: 100/51 (03-10-18 @ 09:30) (93/44 - 128/62)  RR: 18 (03-10-18 @ 09:30) (18 - 18)  SpO2: 99% (03-10-18 @ 09:30) (97% - 100%)  Wt(kg): --  I&O's Summary      Appearance: Normal	  Cardiovascular: Normal S1 S2,RRR, No JVD, No murmurs  Respiratory: Lungs clear to auscultation	  Gastrointestinal:  Soft, Non-tender, + BS	  Extremities: Normal range of motion, No clubbing, cyanosis or edema  Vascular: Peripheral pulses palpable 2+ bilaterally    MEDICATIONS  (STANDING):  influenza   Vaccine 0.5 milliLiter(s) IntraMuscular once  oseltamivir 75 milliGRAM(s) Oral two times a day  sodium chloride 0.9%. 1000 milliLiter(s) (150 mL/Hr) IV Continuous <Continuous>      TELEMETRY: 	    ECG:  	  RADIOLOGY:   DIAGNOSTIC TESTING:  [ ] Echocardiogram:  [ ] Catheterization:  [ ] Stress Test:    OTHER: 	    LABS:	 	    CARDIAC MARKERS:                                13.3   5.57  )-----------( 144      ( 10 Mar 2018 06:30 )             40.3     03-10    140  |  107  |  10  ----------------------------<  91  3.8   |  21<L>  |  1.03    Ca    7.8<L>      10 Mar 2018 06:30  Phos  2.6     03-10  Mg     1.6     03-10    TPro  7.5  /  Alb  4.4  /  TBili  0.7  /  DBili  x   /  AST  27  /  ALT  28  /  AlkPhos  54  03-08    proBNP:     Lipid Profile:   HgA1c:

## 2018-03-10 NOTE — PROGRESS NOTE ADULT - ASSESSMENT
· Assessment	  36 y/o M with no PMH presented with flu like symptoms and syncope    +Syncope  +Flu positive-On Tamiflu      Problem/Plan - 1:  ·  Problem: Syncope.  Plan: Tele  TTE   Fall precautions  Cardio/EP f/up noted.     Problem/Plan - 2:  ·  Problem: Influenza.  Plan: RVP positive for Influenza    Tamiflu 75mg BID for 5 days   on droplet precautions.     Problem/Plan - 3:  ·  Problem: Abnormal EKG.  Plan: Tele  TTE.

## 2018-03-10 NOTE — PROGRESS NOTE ADULT - ASSESSMENT
37 year old man admitted with syncope and influenza    1. Acute influenza  tamiflu as ordered  med eval     2. Syncope   likely secondary to volume status in setting of febrile/flu illness  ecg with tachycardia expected from viral infection but with short pr interval and some suggestion of preexcitation on some leads  eps f/u  check echo   tele  hydrate  dvt ppx

## 2018-03-10 NOTE — PROGRESS NOTE ADULT - SUBJECTIVE AND OBJECTIVE BOX
Patient is a 37y old  Male who presents with a chief complaint of Syncope (09 Mar 2018 02:57)      SUBJECTIVE / OVERNIGHT EVENTS:   Feels better.  Denies CP/SOB/Palpitation/HA.    MEDICATIONS  (STANDING):  influenza   Vaccine 0.5 milliLiter(s) IntraMuscular once  oseltamivir 75 milliGRAM(s) Oral two times a day  sodium chloride 0.9%. 1000 milliLiter(s) (150 mL/Hr) IV Continuous <Continuous>    MEDICATIONS  (PRN):  acetaminophen   Tablet 650 milliGRAM(s) Oral every 6 hours PRN For Temp greater than 38 C (100.4 F)        CAPILLARY BLOOD GLUCOSE        I&O's Summary      PHYSICAL EXAM:  GENERAL: NAD, well-developed  HEAD:  Atraumatic, Normocephalic  NECK: Supple, No JVD  CHEST/LUNG: Clear to auscultation bilaterally; No wheezing.  HEART: Regular rate and rhythm; No murmurs, rubs, or gallops  ABDOMEN: Soft, Nontender, Nondistended; Bowel sounds present  EXTREMITIES:   No clubbing, cyanosis, or edema  NEUROLOGY: AAO X 3  SKIN: No rashes    LABS:                        13.3   5.57  )-----------( 144      ( 10 Mar 2018 06:30 )             40.3     03-10    140  |  107  |  10  ----------------------------<  91  3.8   |  21<L>  |  1.03    Ca    7.8<L>      10 Mar 2018 06:30  Phos  2.6     03-10  Mg     1.6     03-10    TPro  7.5  /  Alb  4.4  /  TBili  0.7  /  DBili  x   /  AST  27  /  ALT  28  /  AlkPhos  54  03-08      CARDIAC MARKERS ( 09 Mar 2018 05:45 )  x     / < 0.06 ng/mL / 107 u/L / 1.00 ng/mL / x      CARDIAC MARKERS ( 08 Mar 2018 23:05 )  x     / < 0.06 ng/mL / 130 u/L / 1.00 ng/mL / x          Urinalysis Basic - ( 10 Mar 2018 10:07 )    Color: COLORL / Appearance: CLEAR / S.011 / pH: 5.5  Gluc: NEGATIVE / Ketone: TRACE  / Bili: NEGATIVE / Urobili: NORMAL mg/dL   Blood: NEGATIVE / Protein: NEGATIVE mg/dL / Nitrite: NEGATIVE   Leuk Esterase: NEGATIVE / RBC: 0-2 / WBC 0-2   Sq Epi: x / Non Sq Epi: x / Bacteria: x      CAPILLARY BLOOD GLUCOSE                    RADIOLOGY & ADDITIONAL TESTS:    Imaging Personally Reviewed:    Consultant(s) Notes Reviewed:      Care Discussed with Consultants/Other Providers:

## 2018-03-11 LAB
BUN SERPL-MCNC: 9 MG/DL — SIGNIFICANT CHANGE UP (ref 7–23)
CALCIUM SERPL-MCNC: 8.3 MG/DL — LOW (ref 8.4–10.5)
CHLORIDE SERPL-SCNC: 107 MMOL/L — SIGNIFICANT CHANGE UP (ref 98–107)
CO2 SERPL-SCNC: 23 MMOL/L — SIGNIFICANT CHANGE UP (ref 22–31)
CREAT SERPL-MCNC: 0.91 MG/DL — SIGNIFICANT CHANGE UP (ref 0.5–1.3)
GLUCOSE SERPL-MCNC: 80 MG/DL — SIGNIFICANT CHANGE UP (ref 70–99)
HCT VFR BLD CALC: 42.3 % — SIGNIFICANT CHANGE UP (ref 39–50)
HGB BLD-MCNC: 14.2 G/DL — SIGNIFICANT CHANGE UP (ref 13–17)
MAGNESIUM SERPL-MCNC: 2.7 MG/DL — HIGH (ref 1.6–2.6)
MCHC RBC-ENTMCNC: 31.2 PG — SIGNIFICANT CHANGE UP (ref 27–34)
MCHC RBC-ENTMCNC: 33.6 % — SIGNIFICANT CHANGE UP (ref 32–36)
MCV RBC AUTO: 93 FL — SIGNIFICANT CHANGE UP (ref 80–100)
NRBC # FLD: 0 — SIGNIFICANT CHANGE UP
PLATELET # BLD AUTO: 143 K/UL — LOW (ref 150–400)
PMV BLD: 10.7 FL — SIGNIFICANT CHANGE UP (ref 7–13)
POTASSIUM SERPL-MCNC: 3.9 MMOL/L — SIGNIFICANT CHANGE UP (ref 3.5–5.3)
POTASSIUM SERPL-SCNC: 3.9 MMOL/L — SIGNIFICANT CHANGE UP (ref 3.5–5.3)
RBC # BLD: 4.55 M/UL — SIGNIFICANT CHANGE UP (ref 4.2–5.8)
RBC # FLD: 13 % — SIGNIFICANT CHANGE UP (ref 10.3–14.5)
SODIUM SERPL-SCNC: 140 MMOL/L — SIGNIFICANT CHANGE UP (ref 135–145)
SPECIMEN SOURCE: SIGNIFICANT CHANGE UP
WBC # BLD: 4.93 K/UL — SIGNIFICANT CHANGE UP (ref 3.8–10.5)
WBC # FLD AUTO: 4.93 K/UL — SIGNIFICANT CHANGE UP (ref 3.8–10.5)

## 2018-03-11 RX ADMIN — Medication 75 MILLIGRAM(S): at 05:15

## 2018-03-11 RX ADMIN — SODIUM CHLORIDE 150 MILLILITER(S): 9 INJECTION INTRAMUSCULAR; INTRAVENOUS; SUBCUTANEOUS at 05:15

## 2018-03-11 RX ADMIN — Medication 75 MILLIGRAM(S): at 17:31

## 2018-03-11 NOTE — PROGRESS NOTE ADULT - ATTENDING COMMENTS
Agree with above NP note.  cv stable  no tele events  echo with no structural heart disease  eps f/u ? ilr

## 2018-03-11 NOTE — PROGRESS NOTE ADULT - ASSESSMENT
· Assessment	  36 y/o M with no PMH presented with flu like symptoms and syncope    +Syncope  Likely from flu.  +Flu positive-On Tamiflu      Problem/Plan - 1:  ·  Problem: Syncope.  Plan: Tele  TTE reviewed.  Fall precautions  Cardio/EP f/up noted.     Problem/Plan - 2:  ·  Problem: Influenza.  Plan: RVP positive for Influenza    Tamiflu 75mg BID for 5 days   on droplet precautions.

## 2018-03-11 NOTE — PROGRESS NOTE ADULT - ASSESSMENT
37 year old man admitted with syncope and influenza    1. Acute influenza  tamiflu as ordered  med f/u     2. Syncope   likely secondary to volume status in setting of febrile/flu illness  ecg with tachycardia expected from viral infection but with short pr interval and some suggestion of preexcitation on some leads  eps f/u- plan for possible ILR   echo revealing normal Lv sys fx , normal valves   tele  hydrate  dvt ppx

## 2018-03-11 NOTE — PROGRESS NOTE ADULT - SUBJECTIVE AND OBJECTIVE BOX
CARDIOLOGY FOLLOW UP - Dr. Perez    CC no chest pain or sob       PHYSICAL EXAM:  T(C): 36.8 (03-11-18 @ 13:00), Max: 37.3 (03-11-18 @ 05:14)  HR: 69 (03-11-18 @ 13:00) (65 - 69)  BP: 104/68 (03-11-18 @ 13:00) (102/56 - 116/63)  RR: 18 (03-11-18 @ 13:00) (17 - 18)  SpO2: 100% (03-11-18 @ 13:00) (97% - 100%)  Wt(kg): --  I&O's Summary      Appearance: Normal	  Cardiovascular: Normal S1 S2,RRR, No JVD, No murmurs  Respiratory: Lungs clear to auscultation	  Gastrointestinal:  Soft, Non-tender, + BS	  Extremities: Normal range of motion, No clubbing, cyanosis or edema        MEDICATIONS  (STANDING):  influenza   Vaccine 0.5 milliLiter(s) IntraMuscular once  oseltamivir 75 milliGRAM(s) Oral two times a day  sodium chloride 0.9%. 1000 milliLiter(s) (150 mL/Hr) IV Continuous <Continuous>      TELEMETRY: NSR (short NH) 	    ECG:  	  RADIOLOGY:   DIAGNOSTIC TESTING:  [x ] Echocardiogram:  < from: Transthoracic Echocardiogram (03.09.18 @ 13:53) >  Ejection Fraction (Teicholtz): 67 %    < from: Transthoracic Echocardiogram (03.09.18 @ 13:53) >  CONCLUSIONS:  1. Normal mitral valve. Minimal mitral regurgitation.  2. Normal left ventricular internal dimensions and wall  thicknesses.  3. Normal left ventricular systolic function. No segmental  wall motion abnormalities.  4. Normal right ventricular size and function.  ------------------------------------------------------------------------  Confirmed on  3/9/2018 - 16:34:02 by Hao Enciso M.D.  ------------------------------------------------------------------------    < end of copied text >  >    [ ]  Catheterization:  [ ] Stress Test:    OTHER: 	    LABS:	 	                                14.2   4.93  )-----------( 143      ( 11 Mar 2018 06:20 )             42.3     03-11    140  |  107  |  9   ----------------------------<  80  3.9   |  23  |  0.91    Ca    8.3<L>      11 Mar 2018 06:20  Phos  2.6     03-10  Mg     2.7     03-11

## 2018-03-11 NOTE — PROGRESS NOTE ADULT - SUBJECTIVE AND OBJECTIVE BOX
Patient is a 37y old  Male who presents with a chief complaint of Syncope (09 Mar 2018 02:57)      SUBJECTIVE / OVERNIGHT EVENTS:   Feels better.  Denies CP/SOB/Palpitation/HA.    MEDICATIONS  (STANDING):  influenza   Vaccine 0.5 milliLiter(s) IntraMuscular once  oseltamivir 75 milliGRAM(s) Oral two times a day    MEDICATIONS  (PRN):  acetaminophen   Tablet 650 milliGRAM(s) Oral every 6 hours PRN For Temp greater than 38 C (100.4 F)        CAPILLARY BLOOD GLUCOSE        I&O's Summary      PHYSICAL EXAM:  GENERAL: NAD, well-developed  HEAD:  Atraumatic, Normocephalic  NECK: Supple, No JVD  CHEST/LUNG: Clear to auscultation bilaterally; No wheezing.  HEART: Regular rate and rhythm; No murmurs, rubs, or gallops  ABDOMEN: Soft, Nontender, Nondistended; Bowel sounds present  EXTREMITIES:   No clubbing, cyanosis, or edema  NEUROLOGY: AAO X 3  SKIN: No rashes    LABS:                        14.2   4.93  )-----------( 143      ( 11 Mar 2018 06:20 )             42.3     -11    140  |  107  |  9   ----------------------------<  80  3.9   |  23  |  0.91    Ca    8.3<L>      11 Mar 2018 06:20  Phos  2.6     -10  Mg     2.7                 Urinalysis Basic - ( 10 Mar 2018 10:07 )    Color: COLORL / Appearance: CLEAR / S.011 / pH: 5.5  Gluc: NEGATIVE / Ketone: TRACE  / Bili: NEGATIVE / Urobili: NORMAL mg/dL   Blood: NEGATIVE / Protein: NEGATIVE mg/dL / Nitrite: NEGATIVE   Leuk Esterase: NEGATIVE / RBC: 0-2 / WBC 0-2   Sq Epi: x / Non Sq Epi: x / Bacteria: x      CAPILLARY BLOOD GLUCOSE        03-10 @ 11:03  Culture-urine   NO GROWTH TO DATE  Culture results --  method type --  Organism --  Organism Identification --  Specimen source URINE MIDSTREAM  03-10 @ 01:44  Culture-urine --  Culture results --  method type --  Organism --  Organism Identification --  Specimen source BLOOD PERIPHERAL           03-10 @ 11:03  Culture blood --  Culture results --  Gram stain --  Gram stain blood --  Method type --  Organism --  Organism identification --  Specimen source URINE MIDSTREAM   03-10 @ 01:44  Culture blood   NO ORGANISMS ISOLATED  NO ORGANISMS ISOLATED AT 24 HOURS  Culture results --  Gram stain --  Gram stain blood --  Method type --  Organism --  Organism identification --  Specimen source BLOOD PERIPHERAL      RADIOLOGY & ADDITIONAL TESTS:    Imaging Personally Reviewed:    Consultant(s) Notes Reviewed:      Care Discussed with Consultants/Other Providers:

## 2018-03-12 LAB — BACTERIA UR CULT: SIGNIFICANT CHANGE UP

## 2018-03-12 RX ADMIN — Medication 75 MILLIGRAM(S): at 06:25

## 2018-03-12 RX ADMIN — Medication 75 MILLIGRAM(S): at 17:00

## 2018-03-12 NOTE — PROGRESS NOTE ADULT - SUBJECTIVE AND OBJECTIVE BOX
Patient is a 37y old  Male who presents with a chief complaint of Syncope (09 Mar 2018 02:57)      SUBJECTIVE / OVERNIGHT EVENTS:   Feels better.  Denies CP/SOB/Palpitation/HA.    MEDICATIONS  (STANDING):  influenza   Vaccine 0.5 milliLiter(s) IntraMuscular once  oseltamivir 75 milliGRAM(s) Oral two times a day    MEDICATIONS  (PRN):  acetaminophen   Tablet 650 milliGRAM(s) Oral every 6 hours PRN For Temp greater than 38 C (100.4 F)        CAPILLARY BLOOD GLUCOSE        I&O's Summary      PHYSICAL EXAM:  GENERAL: NAD, well-developed  HEAD:  Atraumatic, Normocephalic  NECK: Supple, No JVD  CHEST/LUNG: Clear to auscultation bilaterally; No wheezing.  HEART: Regular rate and rhythm; No murmurs, rubs, or gallops  ABDOMEN: Soft, Nontender, Nondistended; Bowel sounds present  EXTREMITIES:   No clubbing, cyanosis, or edema  NEUROLOGY: AAO X 3  SKIN: No rashes    LABS:                        14.2   4.93  )-----------( 143      ( 11 Mar 2018 06:20 )             42.3     03-11    140  |  107  |  9   ----------------------------<  80  3.9   |  23  |  0.91    Ca    8.3<L>      11 Mar 2018 06:20  Mg     2.7     03-11              CAPILLARY BLOOD GLUCOSE        03-10 @ 11:03  Culture-urine   NO GROWTH AT 24 HOURS  Culture results --  method type --  Organism --  Organism Identification --  Specimen source URINE MIDSTREAM  03-10 @ 01:44  Culture-urine --  Culture results --  method type --  Organism --  Organism Identification --  Specimen source BLOOD PERIPHERAL           03-10 @ 11:03  Culture blood --  Culture results --  Gram stain --  Gram stain blood --  Method type --  Organism --  Organism identification --  Specimen source URINE MIDSTREAM   03-10 @ 01:44  Culture blood   NO ORGANISMS ISOLATED  NO ORGANISMS ISOLATED AT 48 HRS.  Culture results --  Gram stain --  Gram stain blood --  Method type --  Organism --  Organism identification --  Specimen source BLOOD PERIPHERAL      RADIOLOGY & ADDITIONAL TESTS:    Imaging Personally Reviewed:    Consultant(s) Notes Reviewed:      Care Discussed with Consultants/Other Providers:

## 2018-03-12 NOTE — PROGRESS NOTE ADULT - ASSESSMENT
37 year old male with PMH significant for syncope x2 presented with one syncopal episode in the setting of +influenza.  EKG demonstrated SR with short OH interval and ?preexcitation.  Considering he had one syncopal event last year in the setting of palpitations while sitting and abnormal EKG findings, ILR is recommended.  Explained the potential benefits of ILR, patient is considering.  -Complete 5 days course of Tamiflu  -ILR implantation if patient agrees

## 2018-03-12 NOTE — PROGRESS NOTE ADULT - SUBJECTIVE AND OBJECTIVE BOX
Patient is a 37y old  Male who presents with a chief complaint of Syncope (09 Mar 2018 02:57)    Denies palpitations or dizziness.  On Oseltamivir day #4.    PAST MEDICAL & SURGICAL HISTORY:  No pertinent past medical history  No significant past surgical history      MEDICATIONS  (STANDING):  influenza   Vaccine 0.5 milliLiter(s) IntraMuscular once  oseltamivir 75 milliGRAM(s) Oral two times a day    MEDICATIONS  (PRN):  acetaminophen   Tablet 650 milliGRAM(s) Oral every 6 hours PRN For Temp greater than 38 C (100.4 F)            Vital Signs Last 24 Hrs  T(C): 37.1 (12 Mar 2018 09:05), Max: 37.1 (12 Mar 2018 01:30)  T(F): 98.8 (12 Mar 2018 09:05), Max: 98.8 (12 Mar 2018 09:05)  HR: 60 (12 Mar 2018 09:05) (60 - 73)  BP: 113/69 (12 Mar 2018 09:05) (102/60 - 113/69)  BP(mean): --  RR: 18 (12 Mar 2018 09:05) (17 - 20)  SpO2: 100% (12 Mar 2018 09:05) (100% - 100%)            INTERPRETATION OF TELEMETRY:  SR 70 to 130 bpm    ECG:        LABS:                        14.2   4.93  )-----------( 143      ( 11 Mar 2018 06:20 )             42.3     03-11    140  |  107  |  9   ----------------------------<  80  3.9   |  23  |  0.91    Ca    8.3<L>      11 Mar 2018 06:20  Mg     2.7     03-11            Urinalysis Basic - ( 10 Mar 2018 10:07 )    Color: COLORL / Appearance: CLEAR / S.011 / pH: 5.5  Gluc: NEGATIVE / Ketone: TRACE  / Bili: NEGATIVE / Urobili: NORMAL mg/dL   Blood: NEGATIVE / Protein: NEGATIVE mg/dL / Nitrite: NEGATIVE   Leuk Esterase: NEGATIVE / RBC: 0-2 / WBC 0-2   Sq Epi: x / Non Sq Epi: x / Bacteria: x        BNP  RADIOLOGY & ADDITIONAL STUDIES:  Echo 3/9/2018:  CONCLUSIONS:  1. Normal mitral valve. Minimal mitral regurgitation.  2. Normal left ventricular internal dimensions and wall  thicknesses.  3. Normal left ventricular systolic function. No segmental  wall motion abnormalities.  4. Normal right ventricular size and function.  ------------------------------------------------------------------------  Confirmed on  3/9/2018 - 16:34:02 by Hao Enciso M.D.  ------------------------------------------------------------------------    PHYSICAL EXAM:    GENERAL: In no apparent distress, well nourished, and hydrated.  NECK: Supple and normal thyroid.  No JVD or carotid bruit.  Carotid pulse is 2+ bilaterally.  HEART: Regular rate and rhythm; No murmurs, rubs, or gallops.  PULMONARY: Clear to auscultation and perfusion.  No rales, wheezing, or rhonchi bilaterally.  ABDOMEN: Soft, Nontender, Nondistended; Bowel sounds present  EXTREMITIES:  2+ Peripheral Pulses, No clubbing, cyanosis, or edema  NEUROLOGICAL: Grossly nonfocal

## 2018-03-12 NOTE — PROGRESS NOTE ADULT - ASSESSMENT
· Assessment	  38 y/o M with no PMH presented with flu like symptoms and syncope    +Syncope  Likely from flu.  +Flu positive-On Tamiflu      Problem/Plan - 1:  ·  Problem: Syncope.  Plan: Tele  TTE reviewed.  Fall precautions  Cardio/EP f/up noted.     Problem/Plan - 2:  ·  Problem: Influenza.  Plan: RVP positive for Influenza    Tamiflu 75mg BID for 5 days   on droplet precautions.

## 2018-03-12 NOTE — PROGRESS NOTE ADULT - ASSESSMENT
37 year old man admitted with syncope and influenza    1. Acute influenza  tamiflu as ordered  med f/u     2. Syncope   likely secondary to volume status in setting of febrile/flu illness  ecg with tachycardia expected from viral infection but with short pr interval and some suggestion of preexcitation on some leads  pt refusing  ILR   echo revealing normal Lv sys fx , normal valves     dvt ppx  dc planning per primary team

## 2018-03-12 NOTE — PROGRESS NOTE ADULT - SUBJECTIVE AND OBJECTIVE BOX
CARDIOLOGY FOLLOW UP - Dr. Perez    CC no chest pain or sob       PHYSICAL EXAM:  T(C): 36.7 (03-12-18 @ 12:08), Max: 37.1 (03-12-18 @ 01:30)  HR: 60 (03-12-18 @ 12:08) (60 - 73)  BP: 94/49 (03-12-18 @ 12:08) (94/49 - 113/69)  RR: 18 (03-12-18 @ 12:08) (17 - 20)  SpO2: 100% (03-12-18 @ 12:08) (100% - 100%)  Wt(kg): --  I&O's Summary      Appearance: Normal	  Cardiovascular: Normal S1 S2,RRR, No JVD, No murmurs  Respiratory: Lungs clear to auscultation	  Gastrointestinal:  Soft, Non-tender, + BS	  Extremities: Normal range of motion, No clubbing, cyanosis or edema        MEDICATIONS  (STANDING):  influenza   Vaccine 0.5 milliLiter(s) IntraMuscular once  oseltamivir 75 milliGRAM(s) Oral two times a day      TELEMETRY: NSR     ECG:  	  RADIOLOGY:   DIAGNOSTIC TESTING:  [ ] Echocardiogram:  [ ]  Catheterization:  [ ] Stress Test:    OTHER: 	    LABS:	 	                                14.2   4.93  )-----------( 143      ( 11 Mar 2018 06:20 )             42.3     03-11    140  |  107  |  9   ----------------------------<  80  3.9   |  23  |  0.91    Ca    8.3<L>      11 Mar 2018 06:20  Mg     2.7     03-11

## 2018-03-13 RX ADMIN — Medication 75 MILLIGRAM(S): at 18:43

## 2018-03-13 RX ADMIN — Medication 75 MILLIGRAM(S): at 05:04

## 2018-03-13 NOTE — PROGRESS NOTE ADULT - SUBJECTIVE AND OBJECTIVE BOX
Patient is a 37y old  Male who presents with a chief complaint of Syncope (09 Mar 2018 02:57)  Feeling better.  Denies dizziness/palpitations.  The last dose Tamiflu tonight.    PAST MEDICAL & SURGICAL HISTORY:  No pertinent past medical history  No significant past surgical history      MEDICATIONS  (STANDING):  influenza   Vaccine 0.5 milliLiter(s) IntraMuscular once  oseltamivir 75 milliGRAM(s) Oral two times a day    MEDICATIONS  (PRN):  acetaminophen   Tablet 650 milliGRAM(s) Oral every 6 hours PRN For Temp greater than 38 C (100.4 F)            Vital Signs Last 24 Hrs  T(C): 36.7 (13 Mar 2018 04:16), Max: 37 (12 Mar 2018 21:07)  T(F): 98 (13 Mar 2018 04:16), Max: 98.6 (12 Mar 2018 21:07)  HR: 59 (13 Mar 2018 05:03) (59 - 65)  BP: 90/51 (13 Mar 2018 05:03) (86/52 - 101/51)  BP(mean): --  RR: 16 (13 Mar 2018 04:16) (16 - 18)  SpO2: 100% (13 Mar 2018 04:16) (100% - 100%)            INTERPRETATION OF TELEMETRY:  SR without arrhythmia seen    ECG:        LABS:                    BNP  RADIOLOGY & ADDITIONAL STUDIES:      PHYSICAL EXAM:    GENERAL: In no apparent distress, well nourished, and hydrated.  NECK: Supple and normal thyroid.  No JVD or carotid bruit.  Carotid pulse is 2+ bilaterally.  HEART: Regular rate and rhythm; No murmurs, rubs, or gallops.  PULMONARY: Clear to auscultation and perfusion.  No rales, wheezing, or rhonchi bilaterally.  ABDOMEN: Soft, Nontender, Nondistended; Bowel sounds present  EXTREMITIES:  2+ Peripheral Pulses, No clubbing, cyanosis, or edema  NEUROLOGICAL: Grossly nonfocal

## 2018-03-13 NOTE — PROGRESS NOTE ADULT - ASSESSMENT
37 year old man admitted with syncope and influenza    1. Acute influenza  tamiflu as ordered  med f/u     2. Syncope   likely secondary to volume status in setting of febrile/flu illness  ecg with tachycardia expected from viral infection but with short pr interval and some suggestion of preexcitation on some leads  plan for  ILR tomorrow   ep f.u   echo revealing normal Lv sys fx , normal valves     dvt ppx

## 2018-03-13 NOTE — PROGRESS NOTE ADULT - ASSESSMENT
37 year old male with PMH significant for syncope x2 presented with one syncopal episode in the setting of +influenza.  EKG demonstrated SR with short IL interval and ?preexcitation.  Considering he had one syncopal event last year in the setting of palpitations while sitting and abnormal EKG findings, ILR is recommended.  Patient states understanding and agreed to ILR implantation.    -Complete 5 days course of Tamiflu today  -ILR implantation tomorrow am

## 2018-03-13 NOTE — PROGRESS NOTE ADULT - SUBJECTIVE AND OBJECTIVE BOX
CARDIOLOGY FOLLOW UP - Dr. Perez    CC no chest pain or sob        PHYSICAL EXAM:  T(C): 36.7 (03-13-18 @ 04:16), Max: 37 (03-12-18 @ 21:07)  HR: 59 (03-13-18 @ 05:03) (59 - 65)  BP: 90/51 (03-13-18 @ 05:03) (86/52 - 101/51)  RR: 16 (03-13-18 @ 04:16) (16 - 16)  SpO2: 100% (03-13-18 @ 04:16) (100% - 100%)  Wt(kg): --  I&O's Summary      Appearance: Normal	  Cardiovascular: Normal S1 S2,RRR, No JVD, No murmurs  Respiratory: Lungs clear to auscultation	  Gastrointestinal:  Soft, Non-tender, + BS	  Extremities: Normal range of motion, No clubbing, cyanosis or edema        MEDICATIONS  (STANDING):  influenza   Vaccine 0.5 milliLiter(s) IntraMuscular once  oseltamivir 75 milliGRAM(s) Oral two times a day      TELEMETRY: NSR 	    ECG:  	  RADIOLOGY:   DIAGNOSTIC TESTING:  [ ] Echocardiogram:  [ ]  Catheterization:  [ ] Stress Test:    OTHER: 	    LABS:

## 2018-03-13 NOTE — PROGRESS NOTE ADULT - SUBJECTIVE AND OBJECTIVE BOX
Patient is a 37y old  Male who presents with a chief complaint of Syncope (09 Mar 2018 02:57)      SUBJECTIVE / OVERNIGHT EVENTS:   Feels better.  Denies CP/SOB/Palpitation/HA.    MEDICATIONS  (STANDING):  influenza   Vaccine 0.5 milliLiter(s) IntraMuscular once    MEDICATIONS  (PRN):  acetaminophen   Tablet 650 milliGRAM(s) Oral every 6 hours PRN For Temp greater than 38 C (100.4 F)        CAPILLARY BLOOD GLUCOSE        I&O's Summary      PHYSICAL EXAM:  GENERAL: NAD, well-developed  HEAD:  Atraumatic, Normocephalic  NECK: Supple, No JVD  CHEST/LUNG: Clear to auscultation bilaterally; No wheezing.  HEART: Regular rate and rhythm; No murmurs, rubs, or gallops  ABDOMEN: Soft, Nontender, Nondistended; Bowel sounds present  EXTREMITIES:   No clubbing, cyanosis, or edema  NEUROLOGY: AAO X 3  SKIN: No rashes    LABS:                  CAPILLARY BLOOD GLUCOSE        03-10 @ 11:03  Culture-urine   NO GROWTH AT 24 HOURS  Culture results --  method type --  Organism --  Organism Identification --  Specimen source URINE MIDSTREAM  03-10 @ 01:44  Culture-urine --  Culture results --  method type --  Organism --  Organism Identification --  Specimen source BLOOD PERIPHERAL           03-10 @ 11:03  Culture blood --  Culture results --  Gram stain --  Gram stain blood --  Method type --  Organism --  Organism identification --  Specimen source URINE MIDSTREAM   03-10 @ 01:44  Culture blood   NO ORGANISMS ISOLATED  NO ORGANISMS ISOLATED AT 72 HRS.  Culture results --  Gram stain --  Gram stain blood --  Method type --  Organism --  Organism identification --  Specimen source BLOOD PERIPHERAL      RADIOLOGY & ADDITIONAL TESTS:    Imaging Personally Reviewed:    Consultant(s) Notes Reviewed:      Care Discussed with Consultants/Other Providers:

## 2018-03-13 NOTE — PROGRESS NOTE ADULT - ASSESSMENT
· Assessment	  38 y/o M with no PMH presented with flu like symptoms and syncope    +Syncope  Likely from flu.  +Flu positive- Completed Tamiflu      Problem/Plan - 1:  ·  Problem: Syncope.  Plan: Tele  for ILR  Cardio/EP f/up noted.     Problem/Plan - 2:  ·  Problem: Influenza.  Plan: completed Abx.

## 2018-03-14 ENCOUNTER — TRANSCRIPTION ENCOUNTER (OUTPATIENT)
Age: 38
End: 2018-03-14

## 2018-03-14 VITALS
DIASTOLIC BLOOD PRESSURE: 60 MMHG | RESPIRATION RATE: 18 BRPM | HEART RATE: 65 BPM | OXYGEN SATURATION: 99 % | SYSTOLIC BLOOD PRESSURE: 98 MMHG

## 2018-03-14 PROBLEM — Z00.00 ENCOUNTER FOR PREVENTIVE HEALTH EXAMINATION: Status: ACTIVE | Noted: 2018-03-14

## 2018-03-14 LAB
BUN SERPL-MCNC: 11 MG/DL — SIGNIFICANT CHANGE UP (ref 7–23)
CALCIUM SERPL-MCNC: 8.5 MG/DL — SIGNIFICANT CHANGE UP (ref 8.4–10.5)
CHLORIDE SERPL-SCNC: 102 MMOL/L — SIGNIFICANT CHANGE UP (ref 98–107)
CO2 SERPL-SCNC: 29 MMOL/L — SIGNIFICANT CHANGE UP (ref 22–31)
CREAT SERPL-MCNC: 1.14 MG/DL — SIGNIFICANT CHANGE UP (ref 0.5–1.3)
GLUCOSE SERPL-MCNC: 87 MG/DL — SIGNIFICANT CHANGE UP (ref 70–99)
HCT VFR BLD CALC: 47.7 % — SIGNIFICANT CHANGE UP (ref 39–50)
HGB BLD-MCNC: 15.3 G/DL — SIGNIFICANT CHANGE UP (ref 13–17)
MAGNESIUM SERPL-MCNC: 2.1 MG/DL — SIGNIFICANT CHANGE UP (ref 1.6–2.6)
MCHC RBC-ENTMCNC: 29.7 PG — SIGNIFICANT CHANGE UP (ref 27–34)
MCHC RBC-ENTMCNC: 32.1 % — SIGNIFICANT CHANGE UP (ref 32–36)
MCV RBC AUTO: 92.6 FL — SIGNIFICANT CHANGE UP (ref 80–100)
NRBC # FLD: 0 — SIGNIFICANT CHANGE UP
PLATELET # BLD AUTO: 180 K/UL — SIGNIFICANT CHANGE UP (ref 150–400)
PMV BLD: 10.8 FL — SIGNIFICANT CHANGE UP (ref 7–13)
POTASSIUM SERPL-MCNC: 4.2 MMOL/L — SIGNIFICANT CHANGE UP (ref 3.5–5.3)
POTASSIUM SERPL-SCNC: 4.2 MMOL/L — SIGNIFICANT CHANGE UP (ref 3.5–5.3)
RBC # BLD: 5.15 M/UL — SIGNIFICANT CHANGE UP (ref 4.2–5.8)
RBC # FLD: 12.6 % — SIGNIFICANT CHANGE UP (ref 10.3–14.5)
SODIUM SERPL-SCNC: 139 MMOL/L — SIGNIFICANT CHANGE UP (ref 135–145)
WBC # BLD: 5.26 K/UL — SIGNIFICANT CHANGE UP (ref 3.8–10.5)
WBC # FLD AUTO: 5.26 K/UL — SIGNIFICANT CHANGE UP (ref 3.8–10.5)

## 2018-03-14 PROCEDURE — 33282: CPT

## 2018-03-14 NOTE — DISCHARGE NOTE ADULT - HOSPITAL COURSE
HPI:  38 y/o M with no PMH presented with flu like symptoms and syncope. As per the patient he had flu like symptoms last week which he got over and yesterday went to sleep with a headache and woke up with severe frontal headache. Patient then also endorsed body aches, subjective fever, non productive cough and nasal discharge that started yesterday. Patient stated [place] and [place] so may people. Patient stated that yesterday he was sitting on the chair about to get his hair cut when he developed lightheadedness and dizziness and passed out. Patient stated that he passed out for about few seconds and then when he came about he knew where he was. Patient stated that he had an episode of syncope in the past. Patient denied any head trauma with this syncopal event, any bowel or bladder incontinence. Patient denied any chills, CP, palpitations, N/V/D/C, abdominal pain, dysuria, melena, hematochezia, sick contact, pleuritic or positional chest pain.    On ED admission EKG revealed Sinus rhythm with sinus arrhythmia with short AK interval at a rate of 97 with QTc of 383, CE x 1: Negative, RVP: Positive flu, Phos: 2.4. Patient received 2L of fluid in the ED. (09 Mar 2018 02:57)    On admission:  EKG: NSR at 97 bpm with sinus arrhythmia with short AK interval, QTc 383  CE x2: Negative  CXR: Clear lungs. No pleural effusions or pneumothorax. Cardiac and mediastinal silhouettes within normal limits. Trachea midline. Unremarkable osseous structures.  RVP: Influenza AH1  Urine and Serum tox: Negative  3/8 Orthostatics: Negative  3/9 Echo: EF 67% 1. Normal mitral valve. Minimal mitral regurgitation. 2. Normal left ventricular internal dimensions and wall thicknesses. 3. Normal left ventricular systolic function. No segmental wall motion abnormalities. 4. Normal right ventricular size and function.    Evaluated by cardiology Dr. Perez: 37 year old man admitted with syncope and influenza.  1. Acute influenza -s/p tamiflu x 5 days.   2. Syncope likely secondary to volume status in setting of febrile/flu illness. ecg with tachycardia expected from viral infection but with short pr interval and some suggestion of preexcitation on some leads. Echo revealing normal Lv sys fx , normal valves. EP following.    Evaluated by EP: _______      INCOMPLETE  Patient to follow up with PCP and EP within 1-2 weeks. HPI:  38 y/o M with no PMH presented with flu like symptoms and syncope. As per the patient he had flu like symptoms last week which he got over and yesterday went to sleep with a headache and woke up with severe frontal headache. Patient then also endorsed body aches, subjective fever, non productive cough and nasal discharge that started yesterday. Patient stated that [place] and comes [place] so may people. Patient stated that yesterday he was sitting on the chair about to get his hair cut when he developed lightheadedness and dizziness and passed out. Patient stated that he passed out for about few seconds and then when he came about he knew where he was. Patient stated that he had an episode of syncope in the past. Patient denied any head trauma with this syncopal event, any bowel or bladder incontinence. Patient denied any chills, CP, palpitations, N/V/D/C, abdominal pain, dysuria, melena, hematochezia, sick contact, pleuritic or positional chest pain.    On ED admission EKG revealed Sinus rhythm with sinus arrhythmia with short FL interval at a rate of 97 with QTc of 383, CE x 1: Negative, RVP: Positive flu, Phos: 2.4. Patient received 2L of fluid in the ED. (09 Mar 2018 02:57)    On admission:  EKG: NSR at 97 bpm with sinus arrhythmia with short FL interval, QTc 383  CE x2: Negative  CXR: Clear lungs. No pleural effusions or pneumothorax. Cardiac and mediastinal silhouettes within normal limits. Trachea midline. Unremarkable osseous structures.  RVP: Influenza AH1  Urine and Serum tox: Negative  3/8 Orthostatics: Negative  3/9 Echo: EF 67% 1. Normal mitral valve. Minimal mitral regurgitation. 2. Normal left ventricular internal dimensions and wall thicknesses. 3. Normal left ventricular systolic function. No segmental wall motion abnormalities. 4. Normal right ventricular size and function.    Evaluated by cardiology Dr. Perez: 37 year old man admitted with syncope and influenza.  1. Acute influenza -s/p tamiflu x 5 days.   2. Syncope likely secondary to volume status in setting of febrile/flu illness. ecg with tachycardia expected from viral infection but with short pr interval and some suggestion of preexcitation on some leads. Echo revealing normal Lv sys fx , normal valves. EP following. s/p ILR implant 3/14.     Evaluated by EP: 37 year old male with PMH significant for syncope x2 presented with one syncopal episode in the setting of +influenza.  EKG demonstrated SR with short FL interval and ?preexcitation.  Considering he had one syncopal event last year in the setting of palpitations while sitting and abnormal EKG findings, ILR is recommended.  Patient states understanding and agreed to ILR implantation.  ILR implanted 3/14 by EP.  Device teaching done by EP.  Patient to follow up with EP on 3/27 at 8:15am in the device clinic (appointment made for patient).     Patient cleared for discharge home. Patient to follow up with PCP and EP within 1-2 weeks. HPI:  38 y/o M with no PMH presented with flu like symptoms and syncope. As per the patient he had flu like symptoms last week which he got over and yesterday went to sleep with a headache and woke up with severe frontal headache. Patient then also endorsed body aches, subjective fever, non productive cough and nasal discharge that started yesterday. Patient stated that [place] and comes [place] so may people. Patient stated that yesterday he was sitting on the chair about to get his hair cut when he developed lightheadedness and dizziness and passed out. Patient stated that he passed out for about few seconds and then when he came about he knew where he was. Patient stated that he had an episode of syncope in the past. Patient denied any head trauma with this syncopal event, any bowel or bladder incontinence. Patient denied any chills, CP, palpitations, N/V/D/C, abdominal pain, dysuria, melena, hematochezia, sick contact, pleuritic or positional chest pain.    On ED admission EKG revealed Sinus rhythm with sinus arrhythmia with short MS interval at a rate of 97 with QTc of 383, CE x 1: Negative, RVP: Positive flu, Phos: 2.4. Patient received 2L of fluid in the ED. (09 Mar 2018 02:57)    On admission:  EKG: NSR at 97 bpm with sinus arrhythmia with short MS interval, QTc 383  CE x2: Negative  CXR: Clear lungs. No pleural effusions or pneumothorax. Cardiac and mediastinal silhouettes within normal limits. Trachea midline. Unremarkable osseous structures.  RVP: Influenza AH1  Urine and Serum tox: Negative  3/8 Orthostatics: Negative  3/9 Echo: EF 67% 1. Normal mitral valve. Minimal mitral regurgitation. 2. Normal left ventricular internal dimensions and wall thicknesses. 3. Normal left ventricular systolic function. No segmental wall motion abnormalities. 4. Normal right ventricular size and function.    Evaluated by cardiology Dr. Perez: 37 year old man admitted with syncope and influenza.  1. Acute influenza -s/p tamiflu x 5 days.   2. Syncope likely secondary to volume status in setting of febrile/flu illness. ecg with tachycardia expected from viral infection but with short pr interval and some suggestion of preexcitation on some leads. Echo revealing normal Lv sys fx , normal valves. EP following. s/p ILR implant 3/14.     Evaluated by EP: 37 year old male with PMH significant for syncope x2 presented with one syncopal episode in the setting of +influenza.  EKG demonstrated SR with short MS interval and ?preexcitation.  Considering he had one syncopal event last year in the setting of palpitations while sitting and abnormal EKG findings, ILR is recommended.  Patient states understanding and agreed to ILR implantation.  ILR implanted 3/14 by EP.  Device teaching done by EP.  Patient to follow up with EP on 3/27 at 8:15am in the device clinic (appointment made for patient).     Patient cleared for discharge home by attending MD, cardiology and EP. Patient to follow up with PCP and EP within 1-2 weeks. HPI:  36 y/o M with no PMH presented with flu like symptoms and syncope. As per the patient he had flu like symptoms last week which he got over and yesterday went to sleep with a headache and woke up with severe frontal headache. Patient then also endorsed body aches, subjective fever, non productive cough and nasal discharge that started yesterday. Patient stated that [place] and comes [place] so may people. Patient stated that yesterday he was sitting on the chair about to get his hair cut when he developed lightheadedness and dizziness and passed out. Patient stated that he passed out for about few seconds and then when he came about he knew where he was. Patient stated that he had an episode of syncope in the past. Patient denied any head trauma with this syncopal event, any bowel or bladder incontinence. Patient denied any chills, CP, palpitations, N/V/D/C, abdominal pain, dysuria, melena, hematochezia, sick contact, pleuritic or positional chest pain.    On ED admission EKG revealed Sinus rhythm with sinus arrhythmia with short SD interval at a rate of 97 with QTc of 383, CE x 1: Negative, RVP: Positive flu, Phos: 2.4. Patient received 2L of fluid in the ED. (09 Mar 2018 02:57)    On admission:  EKG: NSR at 97 bpm with sinus arrhythmia with short SD interval, QTc 383  CE x2: Negative  CXR: Clear lungs. No pleural effusions or pneumothorax. Cardiac and mediastinal silhouettes within normal limits. Trachea midline. Unremarkable osseous structures.  RVP: Influenza AH1  Urine and Serum tox: Negative  3/8 Orthostatics: Negative  3/9 Echo: EF 67% 1. Normal mitral valve. Minimal mitral regurgitation. 2. Normal left ventricular internal dimensions and wall thicknesses. 3. Normal left ventricular systolic function. No segmental wall motion abnormalities. 4. Normal right ventricular size and function.    Evaluated by cardiology Dr. Perez: 37 year old man admitted with syncope and influenza.  1. Acute influenza -s/p tamiflu x 5 days.   2. Syncope likely secondary to volume status in setting of febrile/flu illness. ecg with tachycardia expected from viral infection but with short pr interval and some suggestion of preexcitation on some leads. Echo revealing normal Lv sys fx , normal valves. EP following. s/p ILR implant 3/14.   Blood pressure was 88/52 this AM.  Recheck now 107/72.  Patient denied feeling dizzy at time of hypotension.  Discussed with cardiology; encourage po water intake.  Patient not on any anti-hypertensives.  Patient cleared from cards standpoint for discharge.     Evaluated by EP: 37 year old male with PMH significant for syncope x2 presented with one syncopal episode in the setting of +influenza.  EKG demonstrated SR with short SD interval and ?preexcitation.  Considering he had one syncopal event last year in the setting of palpitations while sitting and abnormal EKG findings, ILR is recommended.  Patient states understanding and agreed to ILR implantation.  ILR implanted 3/14 by EP.  Device teaching done by EP.  Patient to follow up with EP on 3/27 at 8:15am in the device clinic (appointment made for patient).     Patient cleared for discharge home by attending MD, cardiology and EP. Patient to follow up with PCP and EP within 1-2 weeks.

## 2018-03-14 NOTE — DISCHARGE NOTE ADULT - CARE PROVIDERS DIRECT ADDRESSES
[email or web address]

## 2018-03-14 NOTE — DISCHARGE NOTE ADULT - ADDITIONAL INSTRUCTIONS
Follow up with your PCP and electrophysiology within 1-2 weeks; call for appointments. Follow up with your PCP within 1-2 weeks; call for appointment.  Follow up in the electrophysiology device clinic on 3/27/18 at 8:15am (Oncology Building 4th floor at [facility]); an appointment has been made for you.

## 2018-03-14 NOTE — DISCHARGE NOTE ADULT - CARE PROVIDER_API CALL
Orlando Barone (MD), Cardiac Electrophysiology; Cardiovascular Disease; Internal Medicine  [id number] [place] [address]  Phone: [phone]  Fax: [phone]    DIOGENES Ruth  Phone: (   )    -  Fax: (   )    -

## 2018-03-14 NOTE — DISCHARGE NOTE ADULT - CARE PLAN
Principal Discharge DX:	Syncope  Goal:	Prevention of future episodes.  Assessment and plan of treatment:	Your syncopal episode was likely secondary to you having flu and being dehydrated.  During your hospitalization you completed a full course of tamiflu.   Stay well hydrated and eat a well balanced diet.  Follow up with your PCP within 1-2 weeks; call for appointment.  Follow up with electrophysiology within 1-2 weeks; call for appointment.  Secondary Diagnosis:	Influenza  Assessment and plan of treatment:	During your hospitalization you completed a course of tamiflu.  Continue good hand hygiene and cover your mouth when you cough to prevent the spread of germs to others.  Eat a well balanced diet, stay well hydrated and get 7-8 hours of sleep per night.   Follow up with your PCP within 1-2 weeks; call for appointment. Principal Discharge DX:	Syncope  Goal:	Prevention of future episodes.  Assessment and plan of treatment:	Your syncopal episode was likely secondary to you having flu and being dehydrated.  During your hospitalization you completed a full course of tamiflu.   Stay well hydrated and eat a well balanced diet.  Follow up with your PCP within 1-2 weeks; call for appointment.  During your hospitalization, a loop recorder was implanted by electrophysiology.   Follow up in the electrophysiology device clinic on 3/27/18 at 8:15am (Oncology Building 4th floor at [facility]); an appointment has been made for you.  Secondary Diagnosis:	Influenza  Assessment and plan of treatment:	During your hospitalization you completed a course of tamiflu.  Continue good hand hygiene and cover your mouth when you cough to prevent the spread of germs to others.  Eat a well balanced diet, stay well hydrated and get 7-8 hours of sleep per night.   Follow up with your PCP within 1-2 weeks; call for appointment.

## 2018-03-14 NOTE — DISCHARGE NOTE ADULT - PLAN OF CARE
Prevention of future episodes. Your syncopal episode was likely secondary to you having flu and being dehydrated.  During your hospitalization you completed a full course of tamiflu.   Stay well hydrated and eat a well balanced diet.  Follow up with your PCP within 1-2 weeks; call for appointment.  Follow up with electrophysiology within 1-2 weeks; call for appointment. During your hospitalization you completed a course of tamiflu.  Continue good hand hygiene and cover your mouth when you cough to prevent the spread of germs to others.  Eat a well balanced diet, stay well hydrated and get 7-8 hours of sleep per night.   Follow up with your PCP within 1-2 weeks; call for appointment. Your syncopal episode was likely secondary to you having flu and being dehydrated.  During your hospitalization you completed a full course of tamiflu.   Stay well hydrated and eat a well balanced diet.  Follow up with your PCP within 1-2 weeks; call for appointment.  During your hospitalization, a loop recorder was implanted by electrophysiology.   Follow up in the electrophysiology device clinic on 3/27/18 at 8:15am (Oncology Building 4th floor at [facility]); an appointment has been made for you.

## 2018-03-14 NOTE — CHART NOTE - NSCHARTNOTEFT_GEN_A_CORE
TOMÁS MYRICK 37y Male s/p ILR implantation.  Site Check:    Dressing is clear/dry/intact.   Site is without hematoma or bleeding.   Vital Signs Last 24 Hrs  T(C): 36.9 (03-14-18 @ 08:59), Max: 36.9 (03-14-18 @ 05:16)  T(F): 98.4 (03-14-18 @ 08:59), Max: 98.4 (03-14-18 @ 05:16)  HR: 63 (03-14-18 @ 08:59) (58 - 66)  BP: 107/72 (03-14-18 @ 08:59) (88/52 - 107/72)  BP(mean): --  RR: 18 (03-14-18 @ 08:59) (17 - 18)  SpO2: 100% (03-14-18 @ 08:59) (99% - 100%)  Patient denies pain at site, CP, SOB. Will continue to monitor.  Discussed with EP NP:  patient with outpatient EP follow up scheduled for 3/27/18 at 8:15am in device clinic.  As per EP, patient cleared for discharge home with outpatient EP follow up as above.

## 2018-03-14 NOTE — DISCHARGE NOTE ADULT - PATIENT PORTAL LINK FT
You can access the [facility] Patient Portal, offered by [facility], by registering with the following website: [email or web address]

## 2018-03-15 LAB
BACTERIA BLD CULT: SIGNIFICANT CHANGE UP
BACTERIA BLD CULT: SIGNIFICANT CHANGE UP

## 2018-03-27 ENCOUNTER — APPOINTMENT (OUTPATIENT)
Dept: ELECTROPHYSIOLOGY | Facility: CLINIC | Age: 38
End: 2018-03-27
Payer: MEDICAID

## 2018-03-27 DIAGNOSIS — R00.2 PALPITATIONS: ICD-10-CM

## 2018-03-27 DIAGNOSIS — Z87.09 PERSONAL HISTORY OF OTHER DISEASES OF THE RESPIRATORY SYSTEM: ICD-10-CM

## 2018-03-27 PROCEDURE — 99024 POSTOP FOLLOW-UP VISIT: CPT

## 2018-05-15 ENCOUNTER — APPOINTMENT (OUTPATIENT)
Dept: ELECTROPHYSIOLOGY | Facility: CLINIC | Age: 38
End: 2018-05-15
Payer: MEDICAID

## 2018-05-15 PROCEDURE — 93298 REM INTERROG DEV EVAL SCRMS: CPT

## 2018-05-15 PROCEDURE — 93299: CPT

## 2018-07-16 ENCOUNTER — APPOINTMENT (OUTPATIENT)
Dept: ELECTROPHYSIOLOGY | Facility: CLINIC | Age: 38
End: 2018-07-16
Payer: SELF-PAY

## 2018-07-16 PROCEDURE — 93298 REM INTERROG DEV EVAL SCRMS: CPT

## 2018-07-16 PROCEDURE — 93299: CPT

## 2018-10-16 ENCOUNTER — NON-APPOINTMENT (OUTPATIENT)
Age: 38
End: 2018-10-16

## 2018-10-16 ENCOUNTER — APPOINTMENT (OUTPATIENT)
Dept: ELECTROPHYSIOLOGY | Facility: CLINIC | Age: 38
End: 2018-10-16
Payer: SELF-PAY

## 2018-10-16 VITALS — HEART RATE: 80 BPM | SYSTOLIC BLOOD PRESSURE: 114 MMHG | DIASTOLIC BLOOD PRESSURE: 61 MMHG

## 2018-10-16 VITALS — HEIGHT: 66 IN | WEIGHT: 150 LBS | BODY MASS INDEX: 24.11 KG/M2 | OXYGEN SATURATION: 98 %

## 2018-10-16 PROCEDURE — 99214 OFFICE O/P EST MOD 30 MIN: CPT

## 2018-10-16 PROCEDURE — 93000 ELECTROCARDIOGRAM COMPLETE: CPT | Mod: 59

## 2018-10-16 PROCEDURE — 93285 PRGRMG DEV EVAL SCRMS IP: CPT

## 2018-11-27 ENCOUNTER — APPOINTMENT (OUTPATIENT)
Dept: ELECTROPHYSIOLOGY | Facility: CLINIC | Age: 38
End: 2018-11-27
Payer: COMMERCIAL

## 2018-11-27 DIAGNOSIS — R55 SYNCOPE AND COLLAPSE: ICD-10-CM

## 2018-11-27 PROCEDURE — 93299: CPT

## 2018-11-27 PROCEDURE — 93298 REM INTERROG DEV EVAL SCRMS: CPT

## 2019-01-09 PROBLEM — R55 SYNCOPE: Status: ACTIVE | Noted: 2018-03-27

## 2019-01-14 ENCOUNTER — APPOINTMENT (OUTPATIENT)
Dept: ELECTROPHYSIOLOGY | Facility: CLINIC | Age: 39
End: 2019-01-14
Payer: COMMERCIAL

## 2019-01-14 PROCEDURE — 93298 REM INTERROG DEV EVAL SCRMS: CPT

## 2019-01-14 PROCEDURE — 93299: CPT

## 2019-03-12 ENCOUNTER — APPOINTMENT (OUTPATIENT)
Dept: ELECTROPHYSIOLOGY | Facility: CLINIC | Age: 39
End: 2019-03-12
Payer: COMMERCIAL

## 2019-03-12 PROCEDURE — 93298 REM INTERROG DEV EVAL SCRMS: CPT

## 2019-03-12 PROCEDURE — 93299: CPT

## 2019-04-16 ENCOUNTER — APPOINTMENT (OUTPATIENT)
Dept: ELECTROPHYSIOLOGY | Facility: CLINIC | Age: 39
End: 2019-04-16
Payer: COMMERCIAL

## 2019-04-16 PROCEDURE — 93298 REM INTERROG DEV EVAL SCRMS: CPT

## 2019-04-16 PROCEDURE — 93299: CPT

## 2019-04-17 ENCOUNTER — APPOINTMENT (OUTPATIENT)
Dept: ELECTROPHYSIOLOGY | Facility: CLINIC | Age: 39
End: 2019-04-17

## 2019-05-20 ENCOUNTER — APPOINTMENT (OUTPATIENT)
Dept: ELECTROPHYSIOLOGY | Facility: CLINIC | Age: 39
End: 2019-05-20
Payer: COMMERCIAL

## 2019-05-20 PROCEDURE — 93299: CPT

## 2019-05-20 PROCEDURE — 93298 REM INTERROG DEV EVAL SCRMS: CPT

## 2019-06-25 ENCOUNTER — APPOINTMENT (OUTPATIENT)
Dept: ELECTROPHYSIOLOGY | Facility: CLINIC | Age: 39
End: 2019-06-25
Payer: COMMERCIAL

## 2019-06-25 PROCEDURE — 93298 REM INTERROG DEV EVAL SCRMS: CPT

## 2019-06-25 PROCEDURE — 93299: CPT

## 2019-08-08 ENCOUNTER — APPOINTMENT (OUTPATIENT)
Dept: ELECTROPHYSIOLOGY | Facility: CLINIC | Age: 39
End: 2019-08-08
Payer: COMMERCIAL

## 2019-08-08 PROCEDURE — 93298 REM INTERROG DEV EVAL SCRMS: CPT

## 2019-08-08 PROCEDURE — 93299: CPT

## 2019-09-10 ENCOUNTER — APPOINTMENT (OUTPATIENT)
Dept: ELECTROPHYSIOLOGY | Facility: CLINIC | Age: 39
End: 2019-09-10
Payer: COMMERCIAL

## 2019-09-10 DIAGNOSIS — I47.1 SUPRAVENTRICULAR TACHYCARDIA: ICD-10-CM

## 2019-09-10 PROCEDURE — 93299: CPT

## 2019-09-10 PROCEDURE — 93298 REM INTERROG DEV EVAL SCRMS: CPT

## 2020-01-21 ENCOUNTER — APPOINTMENT (OUTPATIENT)
Dept: ELECTROPHYSIOLOGY | Facility: CLINIC | Age: 40
End: 2020-01-21
Payer: COMMERCIAL

## 2020-01-21 PROCEDURE — 93298 REM INTERROG DEV EVAL SCRMS: CPT

## 2020-01-21 PROCEDURE — G2066: CPT

## 2020-02-21 ENCOUNTER — APPOINTMENT (OUTPATIENT)
Dept: ELECTROPHYSIOLOGY | Facility: CLINIC | Age: 40
End: 2020-02-21
Payer: COMMERCIAL

## 2020-02-21 PROCEDURE — 93298 REM INTERROG DEV EVAL SCRMS: CPT

## 2020-02-21 PROCEDURE — G2066: CPT

## 2020-04-23 ENCOUNTER — APPOINTMENT (OUTPATIENT)
Dept: ELECTROPHYSIOLOGY | Facility: CLINIC | Age: 40
End: 2020-04-23

## 2020-12-16 ENCOUNTER — TRANSCRIPTION ENCOUNTER (OUTPATIENT)
Age: 40
End: 2020-12-16

## 2021-07-13 ENCOUNTER — NON-APPOINTMENT (OUTPATIENT)
Age: 41
End: 2021-07-13

## 2021-07-13 ENCOUNTER — APPOINTMENT (OUTPATIENT)
Dept: ELECTROPHYSIOLOGY | Facility: CLINIC | Age: 41
End: 2021-07-13
Payer: COMMERCIAL

## 2021-07-13 PROCEDURE — G2066: CPT

## 2021-07-13 PROCEDURE — 93298 REM INTERROG DEV EVAL SCRMS: CPT

## 2021-08-17 ENCOUNTER — APPOINTMENT (OUTPATIENT)
Dept: ELECTROPHYSIOLOGY | Facility: CLINIC | Age: 41
End: 2021-08-17
Payer: COMMERCIAL

## 2021-08-17 ENCOUNTER — NON-APPOINTMENT (OUTPATIENT)
Age: 41
End: 2021-08-17

## 2021-08-17 PROCEDURE — 93298 REM INTERROG DEV EVAL SCRMS: CPT

## 2021-08-17 PROCEDURE — G2066: CPT

## 2021-09-02 ENCOUNTER — NON-APPOINTMENT (OUTPATIENT)
Age: 41
End: 2021-09-02

## 2021-09-21 ENCOUNTER — APPOINTMENT (OUTPATIENT)
Dept: ELECTROPHYSIOLOGY | Facility: CLINIC | Age: 41
End: 2021-09-21

## 2023-10-04 ENCOUNTER — NON-APPOINTMENT (OUTPATIENT)
Age: 43
End: 2023-10-04

## 2023-10-04 ENCOUNTER — APPOINTMENT (OUTPATIENT)
Dept: ELECTROPHYSIOLOGY | Facility: CLINIC | Age: 43
End: 2023-10-04
Payer: OTHER GOVERNMENT

## 2023-10-04 VITALS
WEIGHT: 165 LBS | BODY MASS INDEX: 26.52 KG/M2 | OXYGEN SATURATION: 97 % | HEART RATE: 65 BPM | HEIGHT: 66 IN | SYSTOLIC BLOOD PRESSURE: 113 MMHG | DIASTOLIC BLOOD PRESSURE: 74 MMHG

## 2023-10-04 DIAGNOSIS — Z95.818 PRESENCE OF OTHER CARDIAC IMPLANTS AND GRAFTS: ICD-10-CM

## 2023-10-04 DIAGNOSIS — Z78.9 OTHER SPECIFIED HEALTH STATUS: ICD-10-CM

## 2023-10-04 PROCEDURE — 93000 ELECTROCARDIOGRAM COMPLETE: CPT

## 2023-10-04 PROCEDURE — 99203 OFFICE O/P NEW LOW 30 MIN: CPT

## 2023-10-23 ENCOUNTER — OUTPATIENT (OUTPATIENT)
Dept: OUTPATIENT SERVICES | Facility: HOSPITAL | Age: 43
LOS: 1 days | Discharge: HOME | End: 2023-10-23
Payer: OTHER GOVERNMENT

## 2023-10-23 DIAGNOSIS — R55 SYNCOPE AND COLLAPSE: ICD-10-CM

## 2023-10-23 PROCEDURE — 33286 RMVL SUBQ CAR RHYTHM MNTR: CPT

## 2023-10-23 NOTE — CHART NOTE - NSCHARTNOTEFT_GEN_A_CORE
43 year old male with no past medical history with episode of syncope in 2018 s/p ILR placement with no events since implant who presented for ILR explant s/p explant on 10/23/2023    Plan:   ILR explant  Post-op ILR explant instruction has been verbally explained and given to the patient. Patient was given ID card, Booklet and home monitor. Patient expressed understanding and all questions were answered. A carbon copy is located in the patient chart  Patient is schedule for an appointment  on 11/8/23 at 2:20pm  via telephone( 4th floor Oncology building [facility] [address], [place], [place], [address] [phone] )  You can return to normal activities 24hours after the procedure   No scrubbing the incision site for 2 weeks  No lotion, ointment, powder or direct sunlight to the incision site for 2 weeks   No swimming pool, Jacuzzi, or bath for 5 days.   Patient can shower 24 hours after procedure. Pat the area dry  Pt was instructed to call [phone] if the following occurs:      - fever with temperature > 100.6      - swelling, drainage or bleeding at the site incision       - severe chest pain, SOB, n/v

## 2023-10-23 NOTE — H&P CARDIOLOGY - HISTORY OF PRESENT ILLNESS
43 year old male with no past medical history with episode of syncope in 2018 with ILR placement with no syncopal episodes since the Loop implant and no events noted on the loop monitor. Without complaints.     Presents for loop recorder explant.     please see hard copy H&P in paper chart 10/4/23  PATIENT SEEN AND EXAMINED AND NO NEW CLINICAL CHANGES SINCE office visit

## 2023-10-23 NOTE — H&P CARDIOLOGY - NSICDXFAMILYHX_GEN_ALL_CORE_FT
FAMILY HISTORY:  Grandparent  Still living? Unknown  Family history of cerebrovascular accident (CVA), Age at diagnosis: Age Unknown  Family history of prostate cancer, Age at diagnosis: Age Unknown

## 2023-11-08 ENCOUNTER — APPOINTMENT (OUTPATIENT)
Dept: ELECTROPHYSIOLOGY | Facility: CLINIC | Age: 43
End: 2023-11-08

## 2024-05-04 ENCOUNTER — EMERGENCY (EMERGENCY)
Facility: HOSPITAL | Age: 44
LOS: 1 days | Discharge: HOME | End: 2024-05-04
Attending: EMERGENCY MEDICINE | Admitting: EMERGENCY MEDICINE
Payer: OTHER GOVERNMENT

## 2024-05-04 VITALS
OXYGEN SATURATION: 99 % | RESPIRATION RATE: 15 BRPM | DIASTOLIC BLOOD PRESSURE: 77 MMHG | TEMPERATURE: 98 F | SYSTOLIC BLOOD PRESSURE: 124 MMHG | HEART RATE: 77 BPM

## 2024-05-04 LAB
FLUAV AG NPH QL: SIGNIFICANT CHANGE UP
FLUBV AG NPH QL: SIGNIFICANT CHANGE UP
RSV RNA NPH QL NAA+NON-PROBE: SIGNIFICANT CHANGE UP
SARS-COV-2 RNA SPEC QL NAA+PROBE: SIGNIFICANT CHANGE UP

## 2024-05-04 PROCEDURE — 99284 EMERGENCY DEPT VISIT MOD MDM: CPT

## 2024-05-04 PROCEDURE — 99053 MED SERV 10PM-8AM 24 HR FAC: CPT

## 2024-05-04 PROCEDURE — 93010 ELECTROCARDIOGRAM REPORT: CPT

## 2024-05-04 RX ORDER — ALBUTEROL 90 UG/1
1 AEROSOL, METERED ORAL ONCE
Refills: 0 | Status: COMPLETED | OUTPATIENT
Start: 2024-05-04 | End: 2024-05-04

## 2024-05-04 RX ADMIN — ALBUTEROL 1 PUFF(S): 90 AEROSOL, METERED ORAL at 07:03

## 2024-05-04 RX ADMIN — Medication 100 MILLIGRAM(S): at 06:25

## 2024-05-04 NOTE — ED PROVIDER NOTE - PROGRESS NOTE DETAILS
Jordan, PGY3 - Patient stable for discharge. Understands the Emergency Room work-up and discharge precautions. Will follow-up with pcp Jordan, PGY3 - Patient also notes that he has been having increased allergies recently, will give an albuterol pump to go home with. will provide pump education. Patient stable for discharge. Understands the Emergency Room work-up and discharge precautions. Will follow-up with pcp

## 2024-05-04 NOTE — ED PROVIDER NOTE - OBJECTIVE STATEMENT
83-year-old male with no PMH presenting due to cough, congestion, chest pain when he has his coughing episodes but no chest pain when he is not actively coughing.  Patient had a loop recorder done and follow-up with cardiology years ago, was cleared by cardiology.  Denies fevers, chills, nausea, vomiting,  diarrhea, abdominal pain.

## 2024-05-04 NOTE — ED PROVIDER NOTE - ATTENDING CONTRIBUTION TO CARE
Seen and examined, discussed w resident. Pt. with recent inc. cough/congestion. No fever/chills, no recent illness. Pt. with CP during cough. No pleuritic pain, no assoc. N/V/SOB/diaphoresis. No leg c/o. MMM, clear lungs, heart reg, abd soft, NT to palp, no edema, NT calves.

## 2024-05-04 NOTE — ED ADULT TRIAGE NOTE - CHIEF COMPLAINT QUOTE
C/o cough and chest pain. No complaints of , headache, nausea, dizziness, vomiting  SOB, fever, chills verbalized. no phx

## 2024-05-04 NOTE — ED ADULT NURSE NOTE - NSFALLUNIVINTERV_ED_ALL_ED
Bed/Stretcher in lowest position, wheels locked, appropriate side rails in place/Call bell, personal items and telephone in reach/Instruct patient to call for assistance before getting out of bed/chair/stretcher/Non-slip footwear applied when patient is off stretcher/[place] to call system/Physically safe environment - no spills, clutter or unnecessary equipment/Purposeful proactive rounding/Room/bathroom lighting operational, light cord in reach

## 2024-05-04 NOTE — ED PROVIDER NOTE - PATIENT PORTAL LINK FT
You can access the FollowMyHealth Patient Portal offered by [facility] by registering at the following website: [email or web address]. By joining [facility]’s FollowMyHealth portal, you will also be able to view your health information using other applications (apps) compatible with our system.

## 2024-05-04 NOTE — ED PROVIDER NOTE - CLINICAL SUMMARY MEDICAL DECISION MAKING FREE TEXT BOX
Jordan, PGY3 -  43-year-old man presenting with cough, congestion, pain when coughing, history and exam consistent with viral URI considering that there is a sick contact at work.  Says chest pain only occurs during coughing episodes, no medical history, not consistent with ACS, will not send labs.  EKG shows normal sinus rhythm with no ST elevations with reciprocal depressions. swab, meds, foreseeable discharge with pcp followup. *The above represents an initial assessment/impression. Please refer to progress notes for potential changes in patient clinical course*

## 2024-05-04 NOTE — ED PROVIDER NOTE - NSFOLLOWUPINSTRUCTIONS_ED_ALL_ED_FT
You were seen in the Emergency Room today because of coughing.     Please follow-up with your Primary Care Physician within the week.       WHAT YOU NEED TO KNOW:  An upper respiratory infection is also called a cold. It can affect your nose, throat, ears, and sinuses. Cold symptoms are usually worst for the first 3 to 5 days. Most people get better in 7 to 14 days. You may continue to cough for 2 to 3 weeks. Colds are caused by viruses and do not get better with antibiotics.    DISCHARGE INSTRUCTIONS:    Call your local emergency number (911 in the [place]) if:   •You have chest pain or trouble breathing.  •You have a fever with chills.     Self-care:   •Rest as much as possible. Slowly start to do more each day.  •Drink more liquids as directed. Liquids will help thin and loosen mucus so you can cough it up. Liquids will also help prevent dehydration. Liquids that help prevent dehydration include water, fruit juice, and broth. Do not drink liquids that contain caffeine. Caffeine can increase your risk for dehydration.  •Soothe a sore throat. Gargle with warm salt water. Make salt water by dissolving ¼ teaspoon salt in 1 cup warm water. You may also suck on hard candy or throat lozenges. You may use a sore throat spray.  •Use a humidifier or vaporizer. Use a cool mist humidifier or a vaporizer to increase air moisture in your home. This may make it easier for you to breathe and help decrease your cough. You were seen in the Emergency Room today because of coughing.     Please follow-up with your Primary Care Physician within the week for further monitoring of your symptoms.       WHAT YOU NEED TO KNOW:  An upper respiratory infection is also called a cold. It can affect your nose, throat, ears, and sinuses. Cold symptoms are usually worst for the first 3 to 5 days. Most people get better in 7 to 14 days. You may continue to cough for 2 to 3 weeks. Colds are caused by viruses and do not get better with antibiotics.    DISCHARGE INSTRUCTIONS:    Call your local emergency number (911 in the ) if:   •You have chest pain or trouble breathing.  •You have a fever with chills.   •Any new or concerning symptoms.     Self-care:   •Rest as much as possible. Slowly start to do more each day.  •Drink more liquids as directed. Liquids will help thin and loosen mucus so you can cough it up. Liquids will also help prevent dehydration. Liquids that help prevent dehydration include water, fruit juice, and broth. Do not drink liquids that contain caffeine. Caffeine can increase your risk for dehydration.  •Soothe a sore throat. Gargle with warm salt water. Make salt water by dissolving ¼ teaspoon salt in 1 cup warm water. You may also suck on hard candy or throat lozenges. You may use a sore throat spray.  •Use a humidifier or vaporizer. Use a cool mist humidifier or a vaporizer to increase air moisture in your home. This may make it easier for you to breathe and help decrease your cough. You were seen in the Emergency Room today because of coughing.     Please follow-up with your Primary Care Physician within the week for further monitoring of your symptoms.     Use the albuterol pump as needed.     WHAT YOU NEED TO KNOW:  An upper respiratory infection is also called a cold. It can affect your nose, throat, ears, and sinuses. Cold symptoms are usually worst for the first 3 to 5 days. Most people get better in 7 to 14 days. You may continue to cough for 2 to 3 weeks. Colds are caused by viruses and do not get better with antibiotics.    DISCHARGE INSTRUCTIONS:    Call your local emergency number (911 in the ) if:   •You have chest pain or trouble breathing.  •You have a fever with chills.   •Any new or concerning symptoms.     Self-care:   •Rest as much as possible. Slowly start to do more each day.  •Drink more liquids as directed. Liquids will help thin and loosen mucus so you can cough it up. Liquids will also help prevent dehydration. Liquids that help prevent dehydration include water, fruit juice, and broth. Do not drink liquids that contain caffeine. Caffeine can increase your risk for dehydration.  •Soothe a sore throat. Gargle with warm salt water. Make salt water by dissolving ¼ teaspoon salt in 1 cup warm water. You may also suck on hard candy or throat lozenges. You may use a sore throat spray.  •Use a humidifier or vaporizer. Use a cool mist humidifier or a vaporizer to increase air moisture in your home. This may make it easier for you to breathe and help decrease your cough.

## 2024-05-04 NOTE — ED PROVIDER NOTE - NS ED ROS FT
GENERAL: No fever, no chills  EYES: No change in vision  HEENT: No trouble swallowing or speaking  CARDIAC: +chest pain  PULMONARY: +cough, no SOB  GI: No abdominal pain, no nausea, no vomiting, no diarrhea, no constipation  : No changes in urination  SKIN: No rashes  NEURO: No headache, no numbness  MSK: No joint pain  Otherwise as HPI or negative.

## 2024-11-25 ENCOUNTER — EMERGENCY (EMERGENCY)
Facility: HOSPITAL | Age: 44
LOS: 1 days | Discharge: HOME | End: 2024-11-25
Admitting: EMERGENCY MEDICINE
Payer: OTHER GOVERNMENT

## 2024-11-25 VITALS
HEART RATE: 81 BPM | OXYGEN SATURATION: 100 % | TEMPERATURE: 98 F | WEIGHT: 160.06 LBS | RESPIRATION RATE: 16 BRPM | SYSTOLIC BLOOD PRESSURE: 111 MMHG | DIASTOLIC BLOOD PRESSURE: 70 MMHG

## 2024-11-25 VITALS
RESPIRATION RATE: 16 BRPM | DIASTOLIC BLOOD PRESSURE: 68 MMHG | HEART RATE: 65 BPM | SYSTOLIC BLOOD PRESSURE: 111 MMHG | TEMPERATURE: 98 F | OXYGEN SATURATION: 100 %

## 2024-11-25 PROCEDURE — 99284 EMERGENCY DEPT VISIT MOD MDM: CPT

## 2024-11-25 RX ORDER — IBUPROFEN 200 MG
1 TABLET ORAL
Qty: 10 | Refills: 0
Start: 2024-11-25

## 2024-11-25 RX ORDER — IBUPROFEN 200 MG
600 TABLET ORAL ONCE
Refills: 0 | Status: COMPLETED | OUTPATIENT
Start: 2024-11-25 | End: 2024-11-25

## 2024-11-25 RX ORDER — AMOXICILLIN 500 MG
500 CAPSULE ORAL ONCE
Refills: 0 | Status: COMPLETED | OUTPATIENT
Start: 2024-11-25 | End: 2024-11-25

## 2024-11-25 RX ORDER — AMOXICILLIN AND CLAVULANATE POTASSIUM 600; 42.9 MG/5ML; MG/5ML
1 POWDER, FOR SUSPENSION ORAL
Qty: 14 | Refills: 0
Start: 2024-11-25 | End: 2024-12-01

## 2024-11-25 RX ADMIN — Medication 600 MILLIGRAM(S): at 16:32

## 2024-11-25 RX ADMIN — Medication 500 MILLIGRAM(S): at 16:32

## 2024-11-25 NOTE — CONSULT NOTE ADULT - SUBJECTIVE AND OBJECTIVE BOX
Patient is a 44y old  Male who presents with a chief complaint of gingival swelling of UR     HPI: "44-year-old male with no significant past medical history presents to the emergency department complaining of 2 weeks of right upper rear tooth pain.  Patient states he saw his dentist and was advised he needs a tooth extraction.  Patient denies fevers, chills, chest pain, shortness of breath."      PAST MEDICAL & SURGICAL HISTORY:  No pertinent past medical history  No significant past surgical history    MEDICATIONS  (STANDING):  MEDICATIONS  (PRN):  Allergies  No Known Allergies    FAMILY HISTORY:  Family history of cerebrovascular accident (CVA) (Grandparent)  Family history of prostate cancer (Grandparent)    Vital Signs Last 24 Hrs  T(C): 36.8 (25 Nov 2024 18:24), Max: 36.8 (25 Nov 2024 16:02)  T(F): 98.2 (25 Nov 2024 18:24), Max: 98.2 (25 Nov 2024 16:02)  HR: 65 (25 Nov 2024 18:24) (65 - 81)  BP: 111/68 (25 Nov 2024 18:24) (111/68 - 111/70)  BP(mean): --  RR: 16 (25 Nov 2024 18:24) (16 - 16)  SpO2: 100% (25 Nov 2024 18:24) (100% - 100%)    Parameters below as of 25 Nov 2024 18:24  Patient On (Oxygen Delivery Method): room air    EOE:  TMJ ( -  ) clicks                    ( -   ) pops                    (  -  ) crepitus             Mandible FROM             Facial bones and MOM grossly intact             (  - ) trismus             (  - ) LAD             (  - ) swelling             (  - ) asymmetry             (  - ) palpation             (  - ) SOB             (  - ) dysphagia             (  - ) LOC    IOE:  permanent dentition: grossly intact           hard/soft palate:  ( - ) palatal torus ( + ) swelling noted palatal of #2           tongue/FOM WNL           labial/buccal mucosa ( + ) - Noted minor non-fluctuant swelling of #2 buccal gingiva/mucosa           ( +  ) percussion - #2           ( +  ) palpation - #2 buccal and palatal           ( +  ) swelling - Moderate fluctuant swelling of palatal gingiva of #2, mild non-fluctuant swelling of buccal gingiva    DENTAL RADIOGRAPHS: PAN - Noted PARL of #2    ASSESSMENT:  #2 PARL - planned for extraction by pt's outside dentist. Pt had insurance issues at scheduled visit and is now looking for new dentist to get extraction done.    PROCEDURE:  Went over clinical and radiographic findings.    Went over R/B/A of I&D of #2 palatal region. Answered all questions. Obtained verbal consent.    Topical benzocaine applied to #2 palatal region  Administered 0.5 carpule of Septocaine 4% HCl w/ 1:100,000 epi via palatal infiltration of #2  Confirmed profound anesthesia  Incision made on #2 palatal and noted purulence.  Massaged region and irrigated w/ saline.  No drain inserted.    POIG - Pt understands that he still needs to F/U with outpatient dentist ASAP. [facility] dental clinic information given to pt.    RECOMMENDATIONS:  1) Augmentin 875mg BID 7 days and pain management w/ Tylenol + Ibuprofen  2) No hot/cold/spicy or food with small particles. Warm salt water rinse after meals.  3) Dental F/U with outpatient dentist for comprehensive dental care.   4) If any difficulty swallowing/breathing, fever occur, page dental.     Evan Vallejo DDS #[id number]

## 2024-11-25 NOTE — ED PROVIDER NOTE - CLINICAL SUMMARY MEDICAL DECISION MAKING FREE TEXT BOX
44-year-old male with no significant past medical history presents to the emergency department complaining of 2 weeks of right upper rear tooth pain.  Pt is well appearing, NAD, pain control, antibiotics, follow up dental. 44-year-old male with no significant past medical history presents to the emergency department complaining of 2 weeks of right upper rear tooth pain.  Pt is well appearing, NAD, exam consistent with  dental abscess plan for pain control, antibiotics, dental consult.

## 2024-11-25 NOTE — ED ADULT TRIAGE NOTE - MEANS OF ARRIVAL
Patient called about LOPEZ scheduled on 4/27/17 . Pre-procedural questions asked.  Denies swallowing issues and esophageal issues. Denies previous sedation/anesthesia problems. States does not  have sleep apnea. Has dentures.  Denies recent trauma/surgery/radiation therapy to head/neck/airway. States is able to move neck without difficulty. LOPEZ described to patient. Instructed NPO past midnight and to have a designated  to drive patient home after the procedures due to sedation being given. Instructed to report to        Verbalizes understanding. Questions answered.   
ambulatory

## 2024-11-25 NOTE — ED PROVIDER NOTE - NSFOLLOWUPINSTRUCTIONS_ED_ALL_ED_FT
Follow up with your Primary Medical Doctor in 1-2 days.  Follow up with your Dentist or call the clinic to schedule an appointment [phone].    Soft diet eat on opposite side of mouth.    Take Augmentin one tablet orally 2 x a day x 7 days.  Take Ibuprofen 600mg every 8 hours as needed for pain take with food.    Return to the ER for any persistent/worsening or new symptoms swelling, fevers, chills or any concerning symptoms.

## 2024-11-25 NOTE — ED ADULT NURSE NOTE - CHIEF COMPLAINT QUOTE
pt complains of upper R molar pain. pt was seen by dentist was told need an extraction but was unable to get it do to insurance on 11/17  . pt denies changes in vision, chest pain, headache, nausea, dizziness, vomiting,  SOB, fever, and  chills. pt denies  past medical hx .

## 2024-11-25 NOTE — ED PROVIDER NOTE - OBJECTIVE STATEMENT
44-year-old male with no significant past medical history presents to the emergency department complaining of 2 weeks of right upper rear tooth pain.  Patient states he saw his dentist and was advised he needs a tooth extraction.  Patient denies fevers, chills, chest pain, shortness of breath.

## 2024-11-25 NOTE — ED PROVIDER NOTE - PROGRESS NOTE DETAILS
JORGITO Amin: pt seen and evaluated by dental I&D preformed advised to discharge home on Augmentin and follow up in the clinic.  Pt feels better ambulating without difficulty.  Discharge reviewed and discussed with patient.

## 2024-11-25 NOTE — ED PROVIDER NOTE - PATIENT PORTAL LINK FT
You can access the FollowMyHealth Patient Portal offered by [facility] by registering at the following website: [email or web address]. By joining [facility]’s FollowMyHealth portal, you will also be able to view your health information using other applications (apps) compatible with our system.

## 2025-06-28 NOTE — ED ADULT NURSE NOTE - EXTENSIONS OF SELF_ADULT
Problem: Safety - Adult  Goal: Free from fall injury  Outcome: Progressing     Problem: Chronic Conditions and Co-morbidities  Goal: Patient's chronic conditions and co-morbidity symptoms are monitored and maintained or improved  Outcome: Progressing     Problem: Discharge Planning  Goal: Discharge to home or other facility with appropriate resources  Outcome: Progressing     Problem: Pain  Goal: Verbalizes/displays adequate comfort level or baseline comfort level  Outcome: Progressing     Problem: Skin/Tissue Integrity  Goal: Skin integrity remains intact  Description: 1.  Monitor for areas of redness and/or skin breakdown  2.  Assess vascular access sites hourly  3.  Every 4-6 hours minimum:  Change oxygen saturation probe site  4.  Every 4-6 hours:  If on nasal continuous positive airway pressure, respiratory therapy assess nares and determine need for appliance change or resting period  Outcome: Progressing      None